# Patient Record
Sex: MALE | Race: BLACK OR AFRICAN AMERICAN | NOT HISPANIC OR LATINO | Employment: UNEMPLOYED | URBAN - METROPOLITAN AREA
[De-identification: names, ages, dates, MRNs, and addresses within clinical notes are randomized per-mention and may not be internally consistent; named-entity substitution may affect disease eponyms.]

---

## 2017-01-24 ENCOUNTER — HOSPITAL ENCOUNTER (EMERGENCY)
Facility: HOSPITAL | Age: 42
Discharge: HOME | End: 2017-01-24
Payer: MEDICAID

## 2017-01-24 VITALS
DIASTOLIC BLOOD PRESSURE: 82 MMHG | SYSTOLIC BLOOD PRESSURE: 108 MMHG | HEART RATE: 82 BPM | HEIGHT: 66 IN | RESPIRATION RATE: 18 BRPM | OXYGEN SATURATION: 99 % | WEIGHT: 155 LBS | BODY MASS INDEX: 24.91 KG/M2 | TEMPERATURE: 98 F

## 2017-01-24 DIAGNOSIS — F19.90 IV DRUG USER: ICD-10-CM

## 2017-01-24 DIAGNOSIS — Z76.89 ENCOUNTER FOR INCISION AND DRAINAGE PROCEDURE: ICD-10-CM

## 2017-01-24 DIAGNOSIS — L02.419 ABSCESS OF ANTECUBITAL FOSSA: Primary | ICD-10-CM

## 2017-01-24 PROCEDURE — 99283 EMERGENCY DEPT VISIT LOW MDM: CPT | Mod: 25

## 2017-01-24 PROCEDURE — 96372 THER/PROPH/DIAG INJ SC/IM: CPT

## 2017-01-24 PROCEDURE — 10060 I&D ABSCESS SIMPLE/SINGLE: CPT

## 2017-01-24 PROCEDURE — 25000003 PHARM REV CODE 250: Performed by: NURSE PRACTITIONER

## 2017-01-24 RX ORDER — LIDOCAINE HYDROCHLORIDE 10 MG/ML
10 INJECTION, SOLUTION EPIDURAL; INFILTRATION; INTRACAUDAL; PERINEURAL
Status: COMPLETED | OUTPATIENT
Start: 2017-01-24 | End: 2017-01-24

## 2017-01-24 RX ORDER — CLINDAMYCIN PHOSPHATE 150 MG/ML
600 INJECTION, SOLUTION INTRAVENOUS
Status: COMPLETED | OUTPATIENT
Start: 2017-01-24 | End: 2017-01-24

## 2017-01-24 RX ORDER — DICLOFENAC SODIUM 50 MG/1
50 TABLET, DELAYED RELEASE ORAL 2 TIMES DAILY
Qty: 20 TABLET | Refills: 0 | Status: SHIPPED | OUTPATIENT
Start: 2017-01-24 | End: 2018-07-22

## 2017-01-24 RX ORDER — IBUPROFEN 800 MG/1
800 TABLET ORAL
Status: COMPLETED | OUTPATIENT
Start: 2017-01-24 | End: 2017-01-24

## 2017-01-24 RX ORDER — CLINDAMYCIN HYDROCHLORIDE 150 MG/1
450 CAPSULE ORAL EVERY 8 HOURS
Qty: 63 CAPSULE | Refills: 0 | Status: SHIPPED | OUTPATIENT
Start: 2017-01-24 | End: 2017-01-31

## 2017-01-24 RX ADMIN — CLINDAMYCIN PHOSPHATE 600 MG: 150 INJECTION, SOLUTION INTRAMUSCULAR; INTRAVENOUS at 07:01

## 2017-01-24 RX ADMIN — LIDOCAINE HYDROCHLORIDE 100 MG: 10 INJECTION, SOLUTION EPIDURAL; INFILTRATION; INTRACAUDAL; PERINEURAL at 07:01

## 2017-01-24 RX ADMIN — IBUPROFEN 800 MG: 800 TABLET ORAL at 07:01

## 2017-01-24 NOTE — ED AVS SNAPSHOT
[facility] -   [address] [facility] [address]               Dominic De Jesus Jr.   2017  6:53 PM   ED    Description:  Male : 1975   Department:  [facility] -            Your Care was Coordinated By:     Provider Role From To    Nate Cintron NP Nurse Practitioner 17 1926 --      Reason for Visit     Abscess           Diagnoses this Visit        Comments    Abscess of antecubital fossa    -  Primary     IV drug user         Encounter for incision and drainage procedure           ED Disposition     None           To Do List           Follow-up Information     Follow up with pcp or ER  In 2 days.    Why:  For wound re-check       These Medications        Disp Refills Start End    clindamycin (CLEOCIN) 150 MG capsule 63 capsule 0 2017    Take 3 capsules (450 mg total) by mouth every 8 (eight) hours. - Oral    diclofenac (VOLTAREN) 50 MG EC tablet 20 tablet 0 2017     Take 1 tablet (50 mg total) by mouth 2 (two) times daily. - Oral      [facility]     [facility] -  Assistance  Registered nurses in the [facility] provide clinical advisement, health education, appointment booking, and other advisory services.  Call for this free service at [phone].             Medications           Message regarding Medications     Verify the changes and/or additions to your medication regime listed below are the same as discussed with your clinician today.  If any of these changes or additions are incorrect, please notify your healthcare provider.        START taking these NEW medications        Refills    clindamycin (CLEOCIN) 150 MG capsule 0    Sig: Take 3 capsules (450 mg total) by mouth every 8 (eight) hours.    Class: Print    Route: Oral    diclofenac (VOLTAREN) 50 MG EC tablet 0    Sig: Take 1 tablet (50 mg total) by mouth 2 (two) times daily.    Class: Print    Route:  "Oral      These medications were administered today        Dose Freq    lidocaine (PF) 10 mg/ml (1%) injection 100 mg 10 mL ED 1 Time    Sig: 10 mLs (100 mg total) by Infiltration route ED 1 Time.    Class: Normal    Route: Infiltration    clindamycin injection 600 mg 600 mg ED 1 Time    Sig: Inject 4 mLs (600 mg total) into the muscle ED 1 Time.    Class: Normal    Route: Intramuscular    ibuprofen tablet 800 mg 800 mg ED 1 Time    Sig: Take 1 tablet (800 mg total) by mouth ED 1 Time.    Class: Normal    Route: Oral      STOP taking these medications     ibuprofen (ADVIL,MOTRIN) 800 MG tablet Take 1 tablet (800 mg total) by mouth every 6 (six) hours as needed for Pain.    meloxicam (MOBIC) 15 MG tablet Take 1 tablet (15 mg total) by mouth once daily.    naproxen sodium (ANAPROX) 550 MG tablet Take 1 tablet (550 mg total) by mouth 2 (two) times daily as needed.           Verify that the below list of medications is an accurate representation of the medications you are currently taking.  If none reported, the list may be blank. If incorrect, please contact your healthcare provider. Carry this list with you in case of emergency.           Current Medications     clindamycin (CLEOCIN) 150 MG capsule Take 3 capsules (450 mg total) by mouth every 8 (eight) hours.    diclofenac (VOLTAREN) 50 MG EC tablet Take 1 tablet (50 mg total) by mouth 2 (two) times daily.           Clinical Reference Information           Your Vitals Were     BP Pulse Temp Resp Height Weight    132/87 (BP Location: Right arm, Patient Position: Sitting) 114 98.3 °F (36.8 °C) (Oral) 20 5' 6" (1.676 m) 70.3 kg (155 lb)    SpO2 BMI             98% 25.02 kg/m2         Allergies as of 1/24/2017     No Known Allergies      Immunizations Administered on Date of Encounter - 1/24/2017     None      ED Micro, Lab, POCT     None      ED Imaging Orders     Start Ordered       Status Ordering Provider    01/24/17 1902 01/24/17 1901  X-Ray Elbow Complete Left  1 " time imaging      Final result         Discharge Instructions         Abscess (Incision & Drainage)  An abscess (sometimes called a boil) occurs when bacteria get trapped under the skin and begin to grow. Pus forms inside the abscess as the body responds to the bacteria. An abscess can occur with an insect bite, ingrown hair, blocked oil gland, pimple, cyst, or puncture wound.  Treatment of your abscess has required an incision to drain the pus. If the abscess pocket was large, a gauze packing may have been inserted. This will need to be removed and possibly replaced on your next visit. Antibiotics are not needed in the treatment of a simple abscess, unless the infection is spreading into the skin around the wound (cellulitis).  Healing of the wound will take about 1 to 2 weeks, depending on the size of the abscess. Healthy tissue will grow from the bottom and sides of the opening until it seals over.  Home care  The following home care guidelines can help your wound heal:  · The wound may drain for the first 2 days. Cover the wound with a clean dry dressing. If the dressing becomes soaked with blood or pus, change it.  · If a gauze packing was placed inside the abscess cavity, you may be told to remove it yourself. You may do this in the shower. Once the packing is removed, you should wash the area in the shower or bath 3 to 4 times a day, until the skin opening has closed. Make sure you wash your hands after changing the packing or cleaning the wound.  · If you were prescribed antibiotics, take them as directed until they are all gone.  · You may use acetaminophen or ibuprofen to control pain, unless another pain medicine was prescribed. If you have liver disease or ever had a stomach ulcer, talk with your doctor before using these medicines.  Follow-up care  Follow up with your doctor as advised by our staff. If a gauze packing was inserted in your wound, it should be removed in 1 to 2 days. Check your wound  every day for the signs of worsening infection listed below.  When to seek medical advice  Call your healthcare provider right away if any of the following occur:  · Increasing redness or swelling  · Red streaks in the skin leading away from the wound  · Increasing local pain or swelling  · Continued pus draining from the wound 2 days after treatment  · Fever of 100.4ºF (38ºC) or higher, or as directed by your healthcare provider  · Boil returns when you are at home  © 1291-4806 [employer]. [address] [place] [address]. All rights reserved. This information is not intended as a substitute for professional medical care. Always follow your healthcare professional's instructions.          Wound Care After Packing Removal or Replacement  Packing is a special type of dressing placed inside a wound to help it heal. Once the packing is removed, you need to care for your wound. Good wound care helps prevent infection. Be sure to keep all follow-up appointments with your healthcare provider. Follow these instructions to take care of the wound once youre at home.  Home care  Your healthcare provider may prescribe medicines for pain. Or he or she may suggest an over-the-counter (OTC) pain reliever, such as ibuprofen or acetaminophen. Talk with your healthcare provider before taking any OTC medicines if you have chronic liver or kidney disease, a stomach ulcer, or gastrointestinal bleeding. In certain cases, you may also need to take antibiotics to help prevent infection. If so, take them exactly as directed for as long as directed. Dont stop taking your antibiotics until they are all gone, even if you feel better.  Here are some general care guidelines for your wound:  · Follow your healthcare providers instructions on how to care for your wound. Always wash your hands with soap and warm water before and after tending to your wound.  · If a bandage was put on, remove and change it once a day or  as directed. If the bandage gets wet or dirty, replace it as soon as possible with a new bandage. Use a clean cloth to gently pat the wound dry.  · If your packing was replaced, a small piece of gauze may hang from the wound. It allows fluid to continue draining from the wound. You may need to use an ointment or cream to keep the packing from sticking to the bandage.  · Don't bathe in a tub or soak your wound until your healthcare provider says its OK. Take showers or sponge baths instead. Avoid swimming.  · Dont scratch, rub, scrub, or pick your wound.  · Check your wound daily for the signs of infection listed below.  If your wound was closed, it was likely with one of four types of closures. These include stitches (sutures), strips of surgical tape, skin glue, and staples. Your healthcare provider will decide on the best closure based on the size and location of your wound. Each type of closure needs specific care.  · Sutures. You may want to clean the wound daily after the first 2 to 3 days. To do this, remove the bandage and gently wash the area with soap and warm water. After cleaning, apply a thin layer of antibiotic ointment if recommended. Then put on a new bandage. Sutures on the outside of the skin usually need to be removed by your healthcare provider.  · Surgical tape. Keep the area dry. If it gets wet, blot it dry with a towel. Surgical tape closures usually fall off within 7 to 10 days. If they have not fallen off after 10 days, you can remove them yourself. To remove the tape, use mineral oil or petroleum jelly on a cotton ball to gently rub the adhesive.  · Skin glue. You may shower or bathe as usual. But do not use soaps, lotions, or ointments on the wound area. Do not scrub the wound. After bathing, pat the wound dry with a soft towel. Do not apply liquids like peroxide, ointments, or creams to the wound while the strips or film is in place. Don't scratch, rub, or pick at the strips or film.  Don't put tape directly over the strips or film. Skin adhesive film will fall off naturally in 5 to 10 days. If it does not peel off in 10 days, gently rub petroleum jelly or an ointment onto the film.  · Staples. Take showers or sponge baths. Don't take tub baths. Don't use lotions on the wound area. The area may be cleaned with soap and water 2 to 3 days after the wound was stapled. Don't scrub the wound. Pat it dry with a clean soft cloth or towel. You can use antibiotic ointment if your healthcare provider tells you to. Staples will need to be removed in 10 to 14 days.  Follow-up care  Follow up with your healthcare provider, or as directed. If your packing was replaced, you may need another visit within 1 to 3 days to remove or replace it. If you have sutures or staples, return for their removal as directed.  When to seek medical advice  Call your health care provider right away if you have signs of infection:  · Fever of 1 degree or more above your normal temperature, or as directed by your healthcare provider  · Increasing pain in the wound or pain that doesnt get better even with pain medicine  · Increasing redness or swelling  · Pus or bad-smelling drainage from the wound  Also call your healthcare provider right away if any of these occur:  · Your wound bleeds more than a small amount or wont stop bleeding.  · The edges of your wound come apart.  · You have numbness or weakness in the wound area that doesnt go away.  © 3600-0581 [employer]. [address] [place] [address], [place] [address]. All rights reserved. This information is not intended as a substitute for professional medical care. Always follow your healthcare professional's instructions.          [facility] Sign-Up     Activating your [facility] account is as easy as 1-2-3!     1) Visit [email or web address], select Sign Up Now, enter this activation code and your date of birth, then select Next.  [email or web address]  Expires: 3/10/2017  8:07 PM       2) Create a username and password to use when you visit [facility] in the future and select a security question in case you lose your password and select Next.    3) Enter your e-mail address and click Sign Up!    Additional Information  If you have questions, please e-mail [email or web address] or call [phone] to talk to our [facility] staff. Remember, [facility] is NOT to be used for urgent needs. For medical emergencies, dial 911.         Smoking Cessation     If you would like to quit smoking:   You may be eligible for free services if you are a [place] resident and started smoking cigarettes before September 1, 1988.  Call the Smoking Cessation Trust (SCT) toll free at (678) 881-9141 or (316) 496-2700.   Call 9-686-QUIT-NOW if you do not meet the above criteria.             [facility] complies with applicable Federal civil rights laws and does not discriminate on the basis of race, color, national origin, age, disability, or sex.        Language Assistance Services     ATTENTION: Language assistance services are available, free of charge. Please call [phone].      ATENCIÓN: Si habla español, tiene a ortiz disposición servicios gratuitos de asistencia lingüística. Llame al [phone].     CHÚ Ý: N?u b?n nói Ti?ng Vi?t, có các d?ch v? h? tr? ngôn ng? mi?n phí dành cho b?n. G?i s? [phone].

## 2017-01-25 NOTE — DISCHARGE INSTRUCTIONS
Abscess (Incision & Drainage)  An abscess (sometimes called a boil) occurs when bacteria get trapped under the skin and begin to grow. Pus forms inside the abscess as the body responds to the bacteria. An abscess can occur with an insect bite, ingrown hair, blocked oil gland, pimple, cyst, or puncture wound.  Treatment of your abscess has required an incision to drain the pus. If the abscess pocket was large, a gauze packing may have been inserted. This will need to be removed and possibly replaced on your next visit. Antibiotics are not needed in the treatment of a simple abscess, unless the infection is spreading into the skin around the wound (cellulitis).  Healing of the wound will take about 1 to 2 weeks, depending on the size of the abscess. Healthy tissue will grow from the bottom and sides of the opening until it seals over.  Home care  The following home care guidelines can help your wound heal:  · The wound may drain for the first 2 days. Cover the wound with a clean dry dressing. If the dressing becomes soaked with blood or pus, change it.  · If a gauze packing was placed inside the abscess cavity, you may be told to remove it yourself. You may do this in the shower. Once the packing is removed, you should wash the area in the shower or bath 3 to 4 times a day, until the skin opening has closed. Make sure you wash your hands after changing the packing or cleaning the wound.  · If you were prescribed antibiotics, take them as directed until they are all gone.  · You may use acetaminophen or ibuprofen to control pain, unless another pain medicine was prescribed. If you have liver disease or ever had a stomach ulcer, talk with your doctor before using these medicines.  Follow-up care  Follow up with your doctor as advised by our staff. If a gauze packing was inserted in your wound, it should be removed in 1 to 2 days. Check your wound every day for the signs of worsening infection listed below.  When to  seek medical advice  Call your healthcare provider right away if any of the following occur:  · Increasing redness or swelling  · Red streaks in the skin leading away from the wound  · Increasing local pain or swelling  · Continued pus draining from the wound 2 days after treatment  · Fever of 100.4ºF (38ºC) or higher, or as directed by your healthcare provider  · Boil returns when you are at home  © 5976-0337 [employer]. [address] [place] [address], [place] [address]. All rights reserved. This information is not intended as a substitute for professional medical care. Always follow your healthcare professional's instructions.          Wound Care After Packing Removal or Replacement  Packing is a special type of dressing placed inside a wound to help it heal. Once the packing is removed, you need to care for your wound. Good wound care helps prevent infection. Be sure to keep all follow-up appointments with your healthcare provider. Follow these instructions to take care of the wound once youre at home.  Home care  Your healthcare provider may prescribe medicines for pain. Or he or she may suggest an over-the-counter (OTC) pain reliever, such as ibuprofen or acetaminophen. Talk with your healthcare provider before taking any OTC medicines if you have chronic liver or kidney disease, a stomach ulcer, or gastrointestinal bleeding. In certain cases, you may also need to take antibiotics to help prevent infection. If so, take them exactly as directed for as long as directed. Dont stop taking your antibiotics until they are all gone, even if you feel better.  Here are some general care guidelines for your wound:  · Follow your healthcare providers instructions on how to care for your wound. Always wash your hands with soap and warm water before and after tending to your wound.  · If a bandage was put on, remove and change it once a day or as directed. If the bandage gets wet or dirty, replace it as soon as  possible with a new bandage. Use a clean cloth to gently pat the wound dry.  · If your packing was replaced, a small piece of gauze may hang from the wound. It allows fluid to continue draining from the wound. You may need to use an ointment or cream to keep the packing from sticking to the bandage.  · Don't bathe in a tub or soak your wound until your healthcare provider says its OK. Take showers or sponge baths instead. Avoid swimming.  · Dont scratch, rub, scrub, or pick your wound.  · Check your wound daily for the signs of infection listed below.  If your wound was closed, it was likely with one of four types of closures. These include stitches (sutures), strips of surgical tape, skin glue, and staples. Your healthcare provider will decide on the best closure based on the size and location of your wound. Each type of closure needs specific care.  · Sutures. You may want to clean the wound daily after the first 2 to 3 days. To do this, remove the bandage and gently wash the area with soap and warm water. After cleaning, apply a thin layer of antibiotic ointment if recommended. Then put on a new bandage. Sutures on the outside of the skin usually need to be removed by your healthcare provider.  · Surgical tape. Keep the area dry. If it gets wet, blot it dry with a towel. Surgical tape closures usually fall off within 7 to 10 days. If they have not fallen off after 10 days, you can remove them yourself. To remove the tape, use mineral oil or petroleum jelly on a cotton ball to gently rub the adhesive.  · Skin glue. You may shower or bathe as usual. But do not use soaps, lotions, or ointments on the wound area. Do not scrub the wound. After bathing, pat the wound dry with a soft towel. Do not apply liquids like peroxide, ointments, or creams to the wound while the strips or film is in place. Don't scratch, rub, or pick at the strips or film. Don't put tape directly over the strips or film. Skin adhesive film will  fall off naturally in 5 to 10 days. If it does not peel off in 10 days, gently rub petroleum jelly or an ointment onto the film.  · Staples. Take showers or sponge baths. Don't take tub baths. Don't use lotions on the wound area. The area may be cleaned with soap and water 2 to 3 days after the wound was stapled. Don't scrub the wound. Pat it dry with a clean soft cloth or towel. You can use antibiotic ointment if your healthcare provider tells you to. Staples will need to be removed in 10 to 14 days.  Follow-up care  Follow up with your healthcare provider, or as directed. If your packing was replaced, you may need another visit within 1 to 3 days to remove or replace it. If you have sutures or staples, return for their removal as directed.  When to seek medical advice  Call your health care provider right away if you have signs of infection:  · Fever of 1 degree or more above your normal temperature, or as directed by your healthcare provider  · Increasing pain in the wound or pain that doesnt get better even with pain medicine  · Increasing redness or swelling  · Pus or bad-smelling drainage from the wound  Also call your healthcare provider right away if any of these occur:  · Your wound bleeds more than a small amount or wont stop bleeding.  · The edges of your wound come apart.  · You have numbness or weakness in the wound area that doesnt go away.  © 2503-1024 [employer]. [address] [place] [address], [place] [address]. All rights reserved. This information is not intended as a substitute for professional medical care. Always follow your healthcare professional's instructions.

## 2017-01-25 NOTE — ED PROVIDER NOTES
SCRIBE #1 NOTE: I, Concepción Chadwick, am scribing for, and in the presence of, Nate Cintron NP. I have scribed the entire note.      History      Chief Complaint   Patient presents with    Abscess     to left ac area. denies drug use       Review of patient's allergies indicates:  No Known Allergies     HPI   HPI    1/24/2017, 6:54 PM   History obtained from the patient      History of Present Illness: Dominic De Jesus Jr. is a 41 y.o. male patient who presents to the Emergency Department for left arm pain in the antecubital area secondary to an abscess. Symptoms are constant and moderate in severity. No mitigating or exacerbating factors reported. No associated sxs reported. Patient denies any extremity numbness/weakness, fever, n/v/d, chills, and all other sxs at this time. No prior Tx reported. No further complaints or concerns at this time.         Arrival mode: Personal vehicle    PCP: Primary Doctor No       Past Medical History:  Past medical history reviewed, not relevant    Past Surgical History:  Past surgical history reviewed, not relevant      Family History:  Family History   Problem Relation Age of Onset    Cancer Neg Hx        Social History:  Social History     Social History Main Topics    Smoking status: Current Every Day Smoker     Packs/day: 0.50     Types: Cigarettes    Smokeless tobacco: Unknown    Alcohol use No    Drug use: IV drug use    Sexual activity: Unknown       ROS   Review of Systems   Constitutional: Negative for chills and fever.   HENT: Negative for sore throat.    Respiratory: Negative for shortness of breath.    Cardiovascular: Negative for chest pain.   Gastrointestinal: Negative for diarrhea, nausea and vomiting.   Genitourinary: Negative for dysuria.   Musculoskeletal: Negative for back pain.        (+) left arm pain to ac area   Skin: Negative for rash.   Neurological: Negative for weakness and numbness.   Hematological: Does not bruise/bleed easily.   All other  systems reviewed and are negative.      Physical Exam    Initial Vitals   BP Pulse Resp Temp SpO2   01/24/17 1818 01/24/17 1818 01/24/17 1818 01/24/17 1818 01/24/17 1818   132/87 114 20 98.3 °F (36.8 °C) 98 %      Physical Exam  Nursing Notes and Vital Signs Reviewed.  Constitutional: Patient is in no acute distress. Awake and alert. Well-developed and well-nourished.  Head: Atraumatic. Normocephalic.  Eyes: PERRL. EOM intact. Conjunctivae are not pale. No scleral icterus.  ENT: Mucous membranes are moist. Oropharynx is clear and symmetric.    Neck: Supple. Full ROM. No lymphadenopathy.  Cardiovascular: Regular rate. Regular rhythm. No murmurs, rubs, or gallops. Distal pulses are 2+ and symmetric.  Pulmonary/Chest: No respiratory distress. Clear to auscultation bilaterally. No wheezing, rales, or rhonchi.  Abdominal: Soft and non-distended.  There is no tenderness.  No rebound, guarding, or rigidity.  Musculoskeletal: Moves all extremities. No obvious deformities. No edema. No calf tenderness.  Skin: Warm and dry. Large, fluctuant, and tender abscess noted to the left antecubital area. FROM in elbow. Redness and swelling doesn't circumvent or cross the joint lines.  Neurological:  Alert, awake, and appropriate.  Normal speech.  No acute focal neurological deficits are appreciated.  Psychiatric: Normal affect. Good eye contact. Appropriate in content.      ED Course    I & D - Incision and Drainage  Date/Time: 1/24/2017 8:06 PM  Performed by: CHRISTIANO STRICKLAND  Authorized by: CHRISTIANO STRICKLAND   Type: abscess  Body area: upper extremity  Location details: left arm    Anesthesia:  Local Anesthetic: lidocaine 1% without epinephrine   Patient sedated: no  Scalpel size: 11  Incision type: single straight  Complexity: simple  Drainage: purulent  Drainage amount: copious  Wound treatment: incision,  wound packed and  drainage  Packing material: 1/2 in gauze  Complications: No  Patient tolerance: Patient tolerated the procedure  "well with no immediate complications        ED Vital Signs:  Vitals:    01/24/17 1818 01/24/17 2000   BP: 132/87 108/82   Pulse: (!) 114 82   Resp: 20 18   Temp: 98.3 °F (36.8 °C)    TempSrc: Oral    SpO2: 98% 99%   Weight: 70.3 kg (155 lb)    Height: 5' 6" (1.676 m)        Imaging Results:  Imaging Results         X-Ray Elbow Complete Left (Final result) Result time:  01/24/17 19:49:23    Final result by LEONID Narvaez Sr., MD (01/24/17 19:49:23)    Impression:         Normal study.      Electronically signed by: LEONID NARVAEZ MD  Date:     01/24/17  Time:    19:49     Narrative:    4 view x-ray of the left elbow    Clinical History:     Other psychoactive substance use, unspecified, uncomplicated    Findings:     There is no fracture. There is no dislocation.                      The Emergency Provider reviewed the vital signs and test results, which are outlined above.    ED Discussion     8:07 PM: Reassessed pt at this time. Pt is awake, alert, and in NAD currently. Pt states his condition has improved at this time. Discussed with pt all pertinent ED information and results. Discussed pt dx and plan of tx. Gave pt all f/u and return to the ED instructions. All questions and concerns were addressed at this time. Pt expresses understanding of information and instructions, and is comfortable with plan to discharge. Pt is stable for discharge.    I discussed wound care precautions with patient and/or family/caretaker; specifically that all wounds have risk of infection despite efforts to cleanse and debride the wound; and there is a risk of an occult foreign body (and thus increased risk of infection) despite a negative examination.  I discussed with patient need to return for any signs of infection, specifically redness, increased pain, fever, drainage of pus, or any concern, immediately.          ED Medication(s):  Medications   lidocaine (PF) 10 mg/ml (1%) injection 100 mg (100 mg Infiltration Given 1/24/17 1915) "   clindamycin injection 600 mg (600 mg Intramuscular Given 1/24/17 1915)   ibuprofen tablet 800 mg (800 mg Oral Given 1/24/17 1915)       Discharge Medication List as of 1/24/2017  8:07 PM      START taking these medications    Details   clindamycin (CLEOCIN) 150 MG capsule Take 3 capsules (450 mg total) by mouth every 8 (eight) hours., Starting 1/24/2017, Until Tue 1/31/17, Print      diclofenac (VOLTAREN) 50 MG EC tablet Take 1 tablet (50 mg total) by mouth 2 (two) times daily., Starting 1/24/2017, Until Discontinued, Print             Follow-up Information     Follow up with pcp or ER  In 2 days.    Why:  For wound re-check            Medical Decision Making    Medical Decision Making:   Clinical Tests:   Radiological Study: Ordered and Reviewed     Additional MDM:   Smoking Cessation: The patient was counseled on tobacco related  health complications.        Scribe Attestation:   Scribe #1: I performed the above scribed service and the documentation accurately describes the services I performed. I attest to the accuracy of the note.    Attending:   Physician Attestation Statement for Scribe #1: I, Nate Cintron NP, personally performed the services described in this documentation, as scribed by Concepción Chadwick, in my presence, and it is both accurate and complete.         Attending Attestation:     Physician Attestation Statement for NP/PA:   I discussed this assessment and plan of this patient with the NP/PA, but I did not personally examine the patient. The face to face encounter was performed by the NP/PA.              Clinical Impression       ICD-10-CM ICD-9-CM   1. Abscess of antecubital fossa L02.419 682.3   2. IV drug user F19.90 305.90   3. Encounter for incision and drainage procedure Z01.89 V72.85       Disposition:   Disposition: Discharged  Condition: Stable         Nate Cintron NP  01/25/17 0213       Kaylah Hernández MD  01/25/17 [id number]

## 2018-07-19 ENCOUNTER — HOSPITAL ENCOUNTER (INPATIENT)
Facility: HOSPITAL | Age: 43
LOS: 1 days | Discharge: LEFT BEFORE DISCHARGE | DRG: 513 | End: 2018-07-20
Attending: ORTHOPAEDIC SURGERY | Admitting: ORTHOPAEDIC SURGERY
Payer: MEDICAID

## 2018-07-19 ENCOUNTER — ANESTHESIA EVENT (OUTPATIENT)
Dept: SURGERY | Facility: HOSPITAL | Age: 43
DRG: 513 | End: 2018-07-19
Payer: MEDICAID

## 2018-07-19 ENCOUNTER — ANESTHESIA (OUTPATIENT)
Dept: SURGERY | Facility: HOSPITAL | Age: 43
DRG: 513 | End: 2018-07-19
Payer: MEDICAID

## 2018-07-19 VITALS
DIASTOLIC BLOOD PRESSURE: 75 MMHG | HEIGHT: 66 IN | OXYGEN SATURATION: 97 % | HEART RATE: 105 BPM | SYSTOLIC BLOOD PRESSURE: 126 MMHG | TEMPERATURE: 98 F | WEIGHT: 151.25 LBS | RESPIRATION RATE: 24 BRPM | BODY MASS INDEX: 24.31 KG/M2

## 2018-07-19 DIAGNOSIS — M25.539 WRIST PAIN: ICD-10-CM

## 2018-07-19 DIAGNOSIS — M25.431 PAIN AND SWELLING OF RIGHT WRIST: Primary | ICD-10-CM

## 2018-07-19 DIAGNOSIS — M25.531 PAIN AND SWELLING OF RIGHT WRIST: Primary | ICD-10-CM

## 2018-07-19 DIAGNOSIS — M79.5 FOREIGN BODY (FB) IN SOFT TISSUE: ICD-10-CM

## 2018-07-19 DIAGNOSIS — L02.91 ABSCESS: ICD-10-CM

## 2018-07-19 LAB
ABO + RH BLD: NORMAL
ALBUMIN SERPL BCP-MCNC: 3.9 G/DL
ALP SERPL-CCNC: 84 U/L
ALT SERPL W/O P-5'-P-CCNC: 36 U/L
ANION GAP SERPL CALC-SCNC: 12 MMOL/L
APTT BLDCRRT: 29.6 SEC
AST SERPL-CCNC: 45 U/L
BASOPHILS # BLD AUTO: 0.03 K/UL
BASOPHILS NFR BLD: 0.3 %
BILIRUB SERPL-MCNC: 0.5 MG/DL
BLD GP AB SCN CELLS X3 SERPL QL: NORMAL
BUN SERPL-MCNC: 9 MG/DL
CALCIUM SERPL-MCNC: 9.7 MG/DL
CHLORIDE SERPL-SCNC: 101 MMOL/L
CO2 SERPL-SCNC: 23 MMOL/L
CREAT SERPL-MCNC: 0.9 MG/DL
CRP SERPL-MCNC: 7 MG/L
DIFFERENTIAL METHOD: NORMAL
EOSINOPHIL # BLD AUTO: 0 K/UL
EOSINOPHIL NFR BLD: 0.3 %
ERYTHROCYTE [DISTWIDTH] IN BLOOD BY AUTOMATED COUNT: 13.4 %
ERYTHROCYTE [SEDIMENTATION RATE] IN BLOOD BY WESTERGREN METHOD: 28 MM/HR
EST. GFR  (AFRICAN AMERICAN): >60 ML/MIN/1.73 M^2
EST. GFR  (NON AFRICAN AMERICAN): >60 ML/MIN/1.73 M^2
GLUCOSE SERPL-MCNC: 100 MG/DL
HCT VFR BLD AUTO: 41.3 %
HGB BLD-MCNC: 14.3 G/DL
INR PPP: 1
LYMPHOCYTES # BLD AUTO: 2.8 K/UL
LYMPHOCYTES NFR BLD: 30.7 %
MCH RBC QN AUTO: 29.2 PG
MCHC RBC AUTO-ENTMCNC: 34.6 G/DL
MCV RBC AUTO: 84 FL
MONOCYTES # BLD AUTO: 0.6 K/UL
MONOCYTES NFR BLD: 6.7 %
NEUTROPHILS # BLD AUTO: 5.7 K/UL
NEUTROPHILS NFR BLD: 62.3 %
PLATELET # BLD AUTO: 267 K/UL
PMV BLD AUTO: 9.2 FL
POTASSIUM SERPL-SCNC: 3.3 MMOL/L
PROT SERPL-MCNC: 8.4 G/DL
PROTHROMBIN TIME: 10.7 SEC
RBC # BLD AUTO: 4.9 M/UL
SODIUM SERPL-SCNC: 136 MMOL/L
WBC # BLD AUTO: 9.13 K/UL

## 2018-07-19 PROCEDURE — 36415 COLL VENOUS BLD VENIPUNCTURE: CPT

## 2018-07-19 PROCEDURE — 71000033 HC RECOVERY, INTIAL HOUR: Performed by: ORTHOPAEDIC SURGERY

## 2018-07-19 PROCEDURE — 63600175 PHARM REV CODE 636 W HCPCS: Performed by: NURSE ANESTHETIST, CERTIFIED REGISTERED

## 2018-07-19 PROCEDURE — 63600175 PHARM REV CODE 636 W HCPCS: Performed by: ORTHOPAEDIC SURGERY

## 2018-07-19 PROCEDURE — 85610 PROTHROMBIN TIME: CPT

## 2018-07-19 PROCEDURE — 25000003 PHARM REV CODE 250: Performed by: ORTHOPAEDIC SURGERY

## 2018-07-19 PROCEDURE — 25000003 PHARM REV CODE 250: Performed by: REGISTERED NURSE

## 2018-07-19 PROCEDURE — 88311 DECALCIFY TISSUE: CPT | Performed by: PATHOLOGY

## 2018-07-19 PROCEDURE — 86140 C-REACTIVE PROTEIN: CPT

## 2018-07-19 PROCEDURE — 87070 CULTURE OTHR SPECIMN AEROBIC: CPT | Mod: 59

## 2018-07-19 PROCEDURE — 25000003 PHARM REV CODE 250: Performed by: NURSE ANESTHETIST, CERTIFIED REGISTERED

## 2018-07-19 PROCEDURE — 63600175 PHARM REV CODE 636 W HCPCS: Performed by: REGISTERED NURSE

## 2018-07-19 PROCEDURE — 11000001 HC ACUTE MED/SURG PRIVATE ROOM

## 2018-07-19 PROCEDURE — 87205 SMEAR GRAM STAIN: CPT | Mod: 59

## 2018-07-19 PROCEDURE — 80053 COMPREHEN METABOLIC PANEL: CPT

## 2018-07-19 PROCEDURE — 88307 TISSUE EXAM BY PATHOLOGIST: CPT | Mod: 26,,, | Performed by: PATHOLOGY

## 2018-07-19 PROCEDURE — 63600175 PHARM REV CODE 636 W HCPCS: Performed by: ANESTHESIOLOGY

## 2018-07-19 PROCEDURE — 87075 CULTR BACTERIA EXCEPT BLOOD: CPT

## 2018-07-19 PROCEDURE — 36000705 HC OR TIME LEV I EA ADD 15 MIN: Performed by: ORTHOPAEDIC SURGERY

## 2018-07-19 PROCEDURE — 88311 DECALCIFY TISSUE: CPT | Mod: 26,,, | Performed by: PATHOLOGY

## 2018-07-19 PROCEDURE — 80074 ACUTE HEPATITIS PANEL: CPT

## 2018-07-19 PROCEDURE — 96375 TX/PRO/DX INJ NEW DRUG ADDON: CPT | Mod: 59

## 2018-07-19 PROCEDURE — 87147 CULTURE TYPE IMMUNOLOGIC: CPT

## 2018-07-19 PROCEDURE — 96374 THER/PROPH/DIAG INJ IV PUSH: CPT

## 2018-07-19 PROCEDURE — 85025 COMPLETE CBC W/AUTO DIFF WBC: CPT

## 2018-07-19 PROCEDURE — 37000009 HC ANESTHESIA EA ADD 15 MINS: Performed by: ORTHOPAEDIC SURGERY

## 2018-07-19 PROCEDURE — 86703 HIV-1/HIV-2 1 RESULT ANTBDY: CPT

## 2018-07-19 PROCEDURE — 99285 EMERGENCY DEPT VISIT HI MDM: CPT | Mod: 25

## 2018-07-19 PROCEDURE — A9585 GADOBUTROL INJECTION: HCPCS | Performed by: REGISTERED NURSE

## 2018-07-19 PROCEDURE — 0PBM0ZZ EXCISION OF RIGHT CARPAL, OPEN APPROACH: ICD-10-PCS | Performed by: ORTHOPAEDIC SURGERY

## 2018-07-19 PROCEDURE — 25500020 PHARM REV CODE 255: Performed by: REGISTERED NURSE

## 2018-07-19 PROCEDURE — 88307 TISSUE EXAM BY PATHOLOGIST: CPT | Performed by: PATHOLOGY

## 2018-07-19 PROCEDURE — 85730 THROMBOPLASTIN TIME PARTIAL: CPT

## 2018-07-19 PROCEDURE — 86850 RBC ANTIBODY SCREEN: CPT

## 2018-07-19 PROCEDURE — 37000008 HC ANESTHESIA 1ST 15 MINUTES: Performed by: ORTHOPAEDIC SURGERY

## 2018-07-19 PROCEDURE — 25000003 PHARM REV CODE 250: Performed by: ANESTHESIOLOGY

## 2018-07-19 PROCEDURE — 96376 TX/PRO/DX INJ SAME DRUG ADON: CPT

## 2018-07-19 PROCEDURE — 85651 RBC SED RATE NONAUTOMATED: CPT

## 2018-07-19 PROCEDURE — 36000704 HC OR TIME LEV I 1ST 15 MIN: Performed by: ORTHOPAEDIC SURGERY

## 2018-07-19 RX ORDER — ONDANSETRON 8 MG/1
8 TABLET, ORALLY DISINTEGRATING ORAL EVERY 8 HOURS PRN
Status: DISCONTINUED | OUTPATIENT
Start: 2018-07-19 | End: 2018-07-20 | Stop reason: HOSPADM

## 2018-07-19 RX ORDER — MORPHINE SULFATE 4 MG/ML
6 INJECTION, SOLUTION INTRAMUSCULAR; INTRAVENOUS
Status: COMPLETED | OUTPATIENT
Start: 2018-07-19 | End: 2018-07-19

## 2018-07-19 RX ORDER — CEFAZOLIN SODIUM 1 G/3ML
INJECTION, POWDER, FOR SOLUTION INTRAMUSCULAR; INTRAVENOUS
Status: DISCONTINUED | OUTPATIENT
Start: 2018-07-19 | End: 2018-07-19

## 2018-07-19 RX ORDER — SODIUM CHLORIDE, SODIUM LACTATE, POTASSIUM CHLORIDE, CALCIUM CHLORIDE 600; 310; 30; 20 MG/100ML; MG/100ML; MG/100ML; MG/100ML
INJECTION, SOLUTION INTRAVENOUS CONTINUOUS PRN
Status: DISCONTINUED | OUTPATIENT
Start: 2018-07-19 | End: 2018-07-19

## 2018-07-19 RX ORDER — CEFAZOLIN SODIUM 1 G/3ML
2 INJECTION, POWDER, FOR SOLUTION INTRAMUSCULAR; INTRAVENOUS ONCE
Status: DISCONTINUED | OUTPATIENT
Start: 2018-07-19 | End: 2018-07-19 | Stop reason: HOSPADM

## 2018-07-19 RX ORDER — AMPICILLIN AND SULBACTAM 2; 1 G/1; G/1
3 INJECTION, POWDER, FOR SOLUTION INTRAMUSCULAR; INTRAVENOUS
Status: DISCONTINUED | OUTPATIENT
Start: 2018-07-19 | End: 2018-07-19

## 2018-07-19 RX ORDER — ONDANSETRON 2 MG/ML
4 INJECTION INTRAMUSCULAR; INTRAVENOUS DAILY PRN
Status: DISCONTINUED | OUTPATIENT
Start: 2018-07-19 | End: 2018-07-19

## 2018-07-19 RX ORDER — CHLORHEXIDINE GLUCONATE ORAL RINSE 1.2 MG/ML
10 SOLUTION DENTAL 2 TIMES DAILY
Status: DISCONTINUED | OUTPATIENT
Start: 2018-07-19 | End: 2018-07-20 | Stop reason: HOSPADM

## 2018-07-19 RX ORDER — GADOBUTROL 604.72 MG/ML
7 INJECTION INTRAVENOUS
Status: COMPLETED | OUTPATIENT
Start: 2018-07-19 | End: 2018-07-19

## 2018-07-19 RX ORDER — LIDOCAINE HYDROCHLORIDE 10 MG/ML
10 INJECTION, SOLUTION EPIDURAL; INFILTRATION; INTRACAUDAL; PERINEURAL
Status: COMPLETED | OUTPATIENT
Start: 2018-07-19 | End: 2018-07-19

## 2018-07-19 RX ORDER — ONDANSETRON 2 MG/ML
4 INJECTION INTRAMUSCULAR; INTRAVENOUS
Status: COMPLETED | OUTPATIENT
Start: 2018-07-19 | End: 2018-07-19

## 2018-07-19 RX ORDER — ONDANSETRON 2 MG/ML
INJECTION INTRAMUSCULAR; INTRAVENOUS
Status: DISCONTINUED | OUTPATIENT
Start: 2018-07-19 | End: 2018-07-19

## 2018-07-19 RX ORDER — AMOXICILLIN 250 MG
1 CAPSULE ORAL 2 TIMES DAILY
Status: DISCONTINUED | OUTPATIENT
Start: 2018-07-19 | End: 2018-07-20 | Stop reason: HOSPADM

## 2018-07-19 RX ORDER — MEPERIDINE HYDROCHLORIDE 50 MG/ML
12.5 INJECTION INTRAMUSCULAR; INTRAVENOUS; SUBCUTANEOUS ONCE AS NEEDED
Status: COMPLETED | OUTPATIENT
Start: 2018-07-19 | End: 2018-07-19

## 2018-07-19 RX ORDER — MORPHINE SULFATE 4 MG/ML
2 INJECTION, SOLUTION INTRAMUSCULAR; INTRAVENOUS
Status: DISCONTINUED | OUTPATIENT
Start: 2018-07-19 | End: 2018-07-20 | Stop reason: HOSPADM

## 2018-07-19 RX ORDER — OXYCODONE AND ACETAMINOPHEN 5; 325 MG/1; MG/1
1 TABLET ORAL EVERY 4 HOURS PRN
Status: DISCONTINUED | OUTPATIENT
Start: 2018-07-19 | End: 2018-07-20 | Stop reason: HOSPADM

## 2018-07-19 RX ORDER — FENTANYL CITRATE 50 UG/ML
INJECTION, SOLUTION INTRAMUSCULAR; INTRAVENOUS
Status: DISCONTINUED | OUTPATIENT
Start: 2018-07-19 | End: 2018-07-19

## 2018-07-19 RX ORDER — NEOMYCIN AND POLYMYXIN B SULFATES 40; 200000 MG/ML; [USP'U]/ML
SOLUTION IRRIGATION
Status: DISCONTINUED | OUTPATIENT
Start: 2018-07-19 | End: 2018-07-19 | Stop reason: HOSPADM

## 2018-07-19 RX ORDER — POLYETHYLENE GLYCOL 3350 17 G/17G
17 POWDER, FOR SOLUTION ORAL DAILY
Status: DISCONTINUED | OUTPATIENT
Start: 2018-07-20 | End: 2018-07-20 | Stop reason: HOSPADM

## 2018-07-19 RX ORDER — SODIUM CHLORIDE 0.9 % (FLUSH) 0.9 %
3 SYRINGE (ML) INJECTION
Status: DISCONTINUED | OUTPATIENT
Start: 2018-07-19 | End: 2018-07-19 | Stop reason: HOSPADM

## 2018-07-19 RX ORDER — OXYCODONE AND ACETAMINOPHEN 5; 325 MG/1; MG/1
1 TABLET ORAL
Status: DISCONTINUED | OUTPATIENT
Start: 2018-07-19 | End: 2018-07-19

## 2018-07-19 RX ORDER — CEFAZOLIN SODIUM 1 G/3ML
INJECTION, POWDER, FOR SOLUTION INTRAMUSCULAR; INTRAVENOUS
Status: DISCONTINUED | OUTPATIENT
Start: 2018-07-19 | End: 2018-07-19 | Stop reason: HOSPADM

## 2018-07-19 RX ORDER — LIDOCAINE HYDROCHLORIDE 10 MG/ML
INJECTION INFILTRATION; PERINEURAL
Status: DISCONTINUED | OUTPATIENT
Start: 2018-07-19 | End: 2018-07-19

## 2018-07-19 RX ORDER — PROPOFOL 10 MG/ML
VIAL (ML) INTRAVENOUS
Status: DISCONTINUED | OUTPATIENT
Start: 2018-07-19 | End: 2018-07-19

## 2018-07-19 RX ORDER — MORPHINE SULFATE 4 MG/ML
2 INJECTION, SOLUTION INTRAMUSCULAR; INTRAVENOUS EVERY 5 MIN PRN
Status: DISCONTINUED | OUTPATIENT
Start: 2018-07-19 | End: 2018-07-19 | Stop reason: HOSPADM

## 2018-07-19 RX ORDER — MORPHINE SULFATE 4 MG/ML
4 INJECTION, SOLUTION INTRAMUSCULAR; INTRAVENOUS
Status: COMPLETED | OUTPATIENT
Start: 2018-07-19 | End: 2018-07-19

## 2018-07-19 RX ORDER — FAMOTIDINE 20 MG/1
20 TABLET, FILM COATED ORAL 2 TIMES DAILY
Status: DISCONTINUED | OUTPATIENT
Start: 2018-07-19 | End: 2018-07-20 | Stop reason: HOSPADM

## 2018-07-19 RX ORDER — HYDROMORPHONE HYDROCHLORIDE 2 MG/ML
1 INJECTION, SOLUTION INTRAMUSCULAR; INTRAVENOUS; SUBCUTANEOUS
Status: COMPLETED | OUTPATIENT
Start: 2018-07-19 | End: 2018-07-19

## 2018-07-19 RX ORDER — BACITRACIN 50000 [IU]/1
INJECTION, POWDER, FOR SOLUTION INTRAMUSCULAR
Status: DISCONTINUED | OUTPATIENT
Start: 2018-07-19 | End: 2018-07-19 | Stop reason: HOSPADM

## 2018-07-19 RX ADMIN — GADOBUTROL 7 ML: 604.72 INJECTION INTRAVENOUS at 06:07

## 2018-07-19 RX ADMIN — PROPOFOL 150 MG: 10 INJECTION, EMULSION INTRAVENOUS at 08:07

## 2018-07-19 RX ADMIN — LIDOCAINE HYDROCHLORIDE 40 MG: 10 INJECTION, SOLUTION INFILTRATION; PERINEURAL at 08:07

## 2018-07-19 RX ADMIN — MEPERIDINE HYDROCHLORIDE 12.5 MG: 50 INJECTION INTRAMUSCULAR; INTRAVENOUS; SUBCUTANEOUS at 10:07

## 2018-07-19 RX ADMIN — FAMOTIDINE 20 MG: 20 TABLET ORAL at 11:07

## 2018-07-19 RX ADMIN — LORAZEPAM 1 MG: 2 INJECTION INTRAMUSCULAR; INTRAVENOUS at 06:07

## 2018-07-19 RX ADMIN — MORPHINE SULFATE 2 MG: 4 INJECTION INTRAVENOUS at 10:07

## 2018-07-19 RX ADMIN — SODIUM CHLORIDE 3 G: 9 INJECTION, SOLUTION INTRAVENOUS at 11:07

## 2018-07-19 RX ADMIN — ONDANSETRON 4 MG: 2 INJECTION, SOLUTION INTRAMUSCULAR; INTRAVENOUS at 04:07

## 2018-07-19 RX ADMIN — MORPHINE SULFATE 6 MG: 4 INJECTION INTRAVENOUS at 05:07

## 2018-07-19 RX ADMIN — PROMETHAZINE HYDROCHLORIDE 6.25 MG: 25 INJECTION INTRAMUSCULAR; INTRAVENOUS at 10:07

## 2018-07-19 RX ADMIN — FENTANYL CITRATE 100 MCG: 50 INJECTION, SOLUTION INTRAMUSCULAR; INTRAVENOUS at 08:07

## 2018-07-19 RX ADMIN — LIDOCAINE HYDROCHLORIDE 100 MG: 10 INJECTION, SOLUTION EPIDURAL; INFILTRATION; INTRACAUDAL; PERINEURAL at 04:07

## 2018-07-19 RX ADMIN — AMPICILLIN SODIUM AND SULBACTAM SODIUM 3 G: 2; 1 INJECTION, POWDER, FOR SOLUTION INTRAMUSCULAR; INTRAVENOUS at 09:07

## 2018-07-19 RX ADMIN — FENTANYL CITRATE 50 MCG: 50 INJECTION, SOLUTION INTRAMUSCULAR; INTRAVENOUS at 09:07

## 2018-07-19 RX ADMIN — CEFAZOLIN 2 G: 1 INJECTION, POWDER, FOR SOLUTION INTRAMUSCULAR; INTRAVENOUS at 09:07

## 2018-07-19 RX ADMIN — MORPHINE SULFATE 4 MG: 4 INJECTION INTRAVENOUS at 04:07

## 2018-07-19 RX ADMIN — CHLORHEXIDINE GLUCONATE 10 ML: 1.2 RINSE ORAL at 11:07

## 2018-07-19 RX ADMIN — ONDANSETRON 4 MG: 2 INJECTION, SOLUTION INTRAMUSCULAR; INTRAVENOUS at 08:07

## 2018-07-19 RX ADMIN — STANDARDIZED SENNA CONCENTRATE AND DOCUSATE SODIUM 1 TABLET: 8.6; 5 TABLET, FILM COATED ORAL at 11:07

## 2018-07-19 RX ADMIN — SODIUM CHLORIDE, SODIUM LACTATE, POTASSIUM CHLORIDE, AND CALCIUM CHLORIDE: 600; 310; 30; 20 INJECTION, SOLUTION INTRAVENOUS at 08:07

## 2018-07-19 RX ADMIN — MORPHINE SULFATE 2 MG: 4 INJECTION INTRAVENOUS at 11:07

## 2018-07-19 RX ADMIN — HYDROMORPHONE HYDROCHLORIDE 1 MG: 2 INJECTION, SOLUTION INTRAMUSCULAR; INTRAVENOUS; SUBCUTANEOUS at 06:07

## 2018-07-19 NOTE — CONSULTS
[facility]  Orthopedics  Consult Note    Patient Name: Dominic De Jesus Jr.  MRN: [id number]  Admission Date: 7/19/2018  Hospital Length of Stay: 0 days  Attending Provider: No att. providers found  Primary Care Provider: Primary Doctor No    Patient information was obtained from patient and ER records.     Consults  Subjective:     Principal Problem:<principal problem not specified>    Chief Complaint:   Chief Complaint   Patient presents with    Abscess     c/o abscess to right AC, patient states pain and swelling following IV drug use        HPI:  42-year-old male who will consult and also a chief complaint of right wrist pain, states the pain began approximately this morning sudden onset worsening since also reports history of needle being broken off in the right antecubital fossa the same side of the patient's wrist complaints.  Still unsure of when that happened maybe a day.  The patient reports intense wrist pain and inability to move his wrist lot of pain we were consulted by the emergency room for evaluation. Patient denies any other pain dizziness nausea vomiting.  Positive for joint pain loss of range of motion.    History reviewed. No pertinent past medical history.    History reviewed. No pertinent surgical history.    Review of patient's allergies indicates:  No Known Allergies    No current facility-administered medications for this encounter.      Current Outpatient Prescriptions   Medication Sig    diclofenac (VOLTAREN) 50 MG EC tablet Take 1 tablet (50 mg total) by mouth 2 (two) times daily.     Family History     None        Social History Main Topics    Smoking status: Current Every Day Smoker     Packs/day: 0.50     Types: Cigarettes    Smokeless tobacco: Not on file    Alcohol use No    Drug use: Yes     Types: IV      Comment: heroin    Sexual activity: Not on file     Review of Systems   Constitution: Negative for chills, decreased appetite, diaphoresis and  "weakness.   HENT: Negative for ear discharge and ear pain.    Eyes: Negative for blurred vision, double vision and pain.   Cardiovascular: Negative for chest pain, claudication and dyspnea on exertion.   Respiratory: Negative for cough, hemoptysis and shortness of breath.    Skin: Negative for color change and nail changes.   Musculoskeletal: Positive for joint pain and joint swelling.   Gastrointestinal: Negative for bloating, abdominal pain and anorexia.   Genitourinary: Negative for bladder incontinence and flank pain.   Neurological: Negative for dizziness, headaches, numbness and paresthesias.   Psychiatric/Behavioral: Negative for altered mental status and memory loss.     Objective:     Vital Signs (Most Recent):  Temp: 97.7 °F (36.5 °C) (07/19/18 1541)  Pulse: 86 (07/19/18 1735)  Resp: (!) 24 (07/19/18 1735)  BP: (!) 158/96 (07/19/18 1735)  SpO2: 100 % (07/19/18 1735) Vital Signs (24h Range):  Temp:  [97.7 °F (36.5 °C)] 97.7 °F (36.5 °C)  Pulse:  [84-86] 86  Resp:  [18-24] 24  SpO2:  [100 %] 100 %  BP: (148-158)/(86-96) 158/96     Weight: 70.3 kg (155 lb)  Height: 5' 6" (167.6 cm)  Body mass index is 25.02 kg/m².    No intake or output data in the 24 hours ending 07/19/18 1804    General    Constitutional: He is oriented to person, place, and time. He appears well-developed and well-nourished.   HENT:   Head: Normocephalic and atraumatic.   Nose: Nose normal.   Eyes: EOM are normal. Pupils are equal, round, and reactive to light.   Cardiovascular: Normal rate and regular rhythm.    Pulmonary/Chest: Effort normal and breath sounds normal.   Abdominal: Soft. Bowel sounds are normal. He exhibits no distension. There is no tenderness.   Neurological: He is alert and oriented to person, place, and time. He has normal reflexes. A cranial nerve deficit is present.   Psychiatric: He has a normal mood and affect.             Right Hand/Wrist Exam     Inspection   Effusion: Wrist - present   Deformity: Wrist - " deformity       Left Hand/Wrist Exam   Left hand exam is normal.        Right upper extremity exam  The patient with intact EPL FPL/FDP-I, pain and limited range of motion with wrist flexion and wrist extension  Tender palpation at dorsal lateral wrist near the radial carpal junction, edema noted, slight warmth to touch  2+ RP    Imaging Results          X-Ray Chest 1 View (Final result)  Result time 07/19/18 17:31:19    Final result by Teofilo Jones MD (07/19/18 17:31:19)                 Impression:      1.  Negative for acute process involving the chest.    2.  Incidental findings as noted above.      Electronically signed by: Teofilo Jones MD  Date:    07/19/2018  Time:    17:31             Narrative:    EXAMINATION:  XR CHEST 1 VIEW    CLINICAL HISTORY:  Pain in unspecified wrist    COMPARISON:  No comparison studies are available.    FINDINGS:  The lungs are clear. The cardiac silhouette size is normal. The trachea is midline and the mediastinal width is normal. Negative for focal infiltrate, effusion or pneumothorax. Pulmonary vasculature is normal. Negative for osseous abnormalities. Convex-left curvature of the upper thoracic spine.  Tortuous aorta.                               X-Ray Wrist Complete Right (Final result)  Result time 07/19/18 16:21:21    Final result by LEONID Narvaez Sr., MD (07/19/18 16:21:21)                 Impression:      1. There are mild osteoarthritic changes in the radiocarpal joint.  2. There is mild prominence of the soft tissue thickness dorsal to the right wrist.  3. If additional imaging evaluation is clinically indicated, I recommend consideration of an MRI examination of the right wrist without IV contrast.      Electronically signed by: Nabil Narvaez MD  Date:    07/19/2018  Time:    16:21             Narrative:    EXAMINATION:  XR WRIST COMPLETE 3 VIEWS RIGHT    CLINICAL HISTORY:  Pain in unspecified wrist    COMPARISON:  None    FINDINGS:  There is no acute fracture  visualized.  There are mild osteoarthritic changes in the radiocarpal joint.  There is no dislocation.  There is mild prominence of the soft tissue thickness dorsal to the right wrist.                               X-Ray Elbow Complete Right (Final result)  Result time 07/19/18 16:18:51    Final result by LEONID Narvaez Sr., MD (07/19/18 16:18:51)                 Impression:      1. There is a 7 mm curvilinear radiopaque object in the soft tissue of the cubital fossa.   This is characteristic of a foreign body.  2. There is no radiographic evidence of osteomyelitis.      Electronically signed by: Nabil Narvaez MD  Date:    07/19/2018  Time:    16:18             Narrative:    EXAMINATION:  XR ELBOW COMPLETE 3 VIEW RIGHT    CLINICAL HISTORY:  Cutaneous abscess, unspecified    COMPARISON:  None    FINDINGS:  There is no fracture. There is no dislocation.  There is a 7 mm curvilinear radiopaque object in the soft tissue of the cubital fossa.  There is no radiographic evidence of osteomyelitis.                             MRI in progress    WBC 9.13, hemoglobin 14.3, hematocrit 41.3, platelets 273    Sodium 3.6, potassium 3.3 low, chloride 101, CO2 23, BUN 9, creatinine 0.9, glucose 100    CRP 7.8 now    Sed rate pending      Significant Labs: All pertinent labs within the past 24 hours have been reviewed.    Significant Imaging: X-Ray: I have reviewed all pertinent results/findings and my personal findings are:  [phone]    Assessment/Plan:     42-year-old male with right swollen wrist pain reduced range of motion and a foreign body in right antecubital fossa  1.  Patient undergo MRI without contrast of the right wrist  2.  Patient last ate at noon plan for OR with probable irrigation debridement washout of right wrist along with exploration removal foreign body right antecubital fossa  3.  Remain in npo  4.  Follow-up MRIs also discussed with Dr. Wnin for further recommendations  There are no [facility]  problems to display for this patient.      Thank you for your consult. I will follow-up with patient. Please contact us if you have any additional questions.    Kuldeep Juan PA-C  Orthopedics  [facility]

## 2018-07-19 NOTE — ED PROVIDER NOTES
SCRIBE #1 NOTE: I, Annie Torres, am scribing for, and in the presence of, Jonathan Woods NP. I have scribed the entire note.      History      Chief Complaint   Patient presents with    Abscess     c/o abscess to right AC, patient states pain and swelling following IV drug use       Review of patient's allergies indicates:  No Known Allergies     HPI   HPI    7/19/2018, 3:47 PM   History obtained from the patient      History of Present Illness: Dominic De Jesus Jr. is a 42 y.o. male patient who presents to the Emergency Department for abscess to right antecubital area which onset gradually today. Symptoms are constant and moderate in severity. Pt reports injecting heroin into R antecubital yesterday. No mitigating or exacerbating factors reported. Pt also c/o R wrist pain. Pt denies any injury to R wrist. Patient denies any CP, SOB, fever, chills, n/v/d, and all other sxs at this time. No further complaints or concerns at this time.         Arrival mode: Personal vehicle     PCP: Primary Doctor No       Past Medical History:  History reviewed. No pertinent past medical history.    Past Surgical History:  History reviewed. No pertinent surgical history.      Family History:  Family History   Problem Relation Age of Onset    Cancer Neg Hx        Social History:  Social History     Social History Main Topics    Smoking status: Current Every Day Smoker     Packs/day: 0.50     Types: Cigarettes    Smokeless tobacco: Not on file    Alcohol use No    Drug use: Yes     Types: IV      Comment: heroin    Sexual activity: Not on file       ROS   Review of Systems   Constitutional: Negative for chills and fever.   HENT: Negative for sore throat.    Respiratory: Negative for shortness of breath.    Cardiovascular: Negative for chest pain.   Gastrointestinal: Negative for abdominal pain, diarrhea, nausea and vomiting.   Genitourinary: Negative for dysuria.   Musculoskeletal: Negative for back pain and neck pain.         (+) R wrist pain   Skin: Negative for rash.        (+) Abscess to R antecubital area   Neurological: Negative for dizziness, syncope, weakness, numbness and headaches.   Hematological: Does not bruise/bleed easily.   All other systems reviewed and are negative.      Physical Exam      Initial Vitals [07/19/18 1541]   BP Pulse Resp Temp SpO2   (!) 148/86 84 18 97.7 °F (36.5 °C) 100 %      MAP       --          Physical Exam  Nursing Notes and Vital Signs Reviewed.  Constitutional: Patient is in no acute distress. Well-developed and well-nourished.  Head: Atraumatic. Normocephalic.  Eyes: PERRL. EOM intact. Conjunctivae are not pale. No scleral icterus.  ENT: Mucous membranes are moist. Oropharynx is clear and symmetric.    Neck: Supple. Full ROM. No lymphadenopathy.  Cardiovascular: Regular rate. Regular rhythm. No murmurs, rubs, or gallops. Distal pulses are 2+ and symmetric.  Pulmonary/Chest: No respiratory distress. Clear to auscultation bilaterally. No wheezing or rales.  Abdominal: Soft and non-distended.  There is no tenderness.  No rebound, guarding, or rigidity. Good bowel sounds.  Genitourinary: No CVA tenderness  Musculoskeletal: Moves all extremities. No obvious deformities. No edema. No calf tenderness.  Right Hand: No obvious deformity. Swelling to dorsal radial side of R wrist. No erythema noted. 1.5 cm erythema to R antecubital area. Track marks over R arm. Limited ROM of wrist secondary to pain. Pain out of proportion of exam. Radial, median, and ulnar nerves are intact. Radial and ulnar pulses are 2+. Normal capillary refill.  Distal sensation is intact.  Skin: Warm and dry.  Neurological:  Alert, awake, and appropriate.  Normal speech.  No acute focal neurological deficits are appreciated.  Psychiatric: Normal affect. Good eye contact. Appropriate in content.    ED Course    Procedures  ED Vital Signs:  Vitals:    07/19/18 1541 07/19/18 1735   BP: (!) 148/86 (!) 158/96   Pulse: 84 86   Resp:  "18 (!) 24   Temp: 97.7 °F (36.5 °C)    SpO2: 100% 100%   Weight: 70.3 kg (155 lb)    Height: 5' 6" (1.676 m)        Abnormal Lab Results:  Labs Reviewed   COMPREHENSIVE METABOLIC PANEL - Abnormal; Notable for the following:        Result Value    Potassium 3.3 (*)     AST 45 (*)     All other components within normal limits   SEDIMENTATION RATE - Abnormal; Notable for the following:     Sed Rate 28 (*)     All other components within normal limits   CBC W/ AUTO DIFFERENTIAL   C-REACTIVE PROTEIN   PROTIME-INR   APTT   RAPID HIV   HEPATITIS PANEL, ACUTE   TYPE AND SCREEN PREOP        All Lab Results:  Results for orders placed or performed during the hospital encounter of 07/19/18   CBC auto differential   Result Value Ref Range    WBC 9.13 3.90 - 12.70 K/uL    RBC 4.90 4.60 - 6.20 M/uL    Hemoglobin 14.3 14.0 - 18.0 g/dL    Hematocrit 41.3 40.0 - 54.0 %    MCV 84 82 - 98 fL    MCH 29.2 27.0 - 31.0 pg    MCHC 34.6 32.0 - 36.0 g/dL    RDW 13.4 11.5 - 14.5 %    Platelets 267 150 - 350 K/uL    MPV 9.2 9.2 - 12.9 fL    Gran # (ANC) 5.7 1.8 - 7.7 K/uL    Lymph # 2.8 1.0 - 4.8 K/uL    Mono # 0.6 0.3 - 1.0 K/uL    Eos # 0.0 0.0 - 0.5 K/uL    Baso # 0.03 0.00 - 0.20 K/uL    Gran% 62.3 38.0 - 73.0 %    Lymph% 30.7 18.0 - 48.0 %    Mono% 6.7 4.0 - 15.0 %    Eosinophil% 0.3 0.0 - 8.0 %    Basophil% 0.3 0.0 - 1.9 %    Differential Method Automated    Comprehensive metabolic panel   Result Value Ref Range    Sodium 136 136 - 145 mmol/L    Potassium 3.3 (L) 3.5 - 5.1 mmol/L    Chloride 101 95 - 110 mmol/L    CO2 23 23 - 29 mmol/L    Glucose 100 70 - 110 mg/dL    BUN, Bld 9 6 - 20 mg/dL    Creatinine 0.9 0.5 - 1.4 mg/dL    Calcium 9.7 8.7 - 10.5 mg/dL    Total Protein 8.4 6.0 - 8.4 g/dL    Albumin 3.9 3.5 - 5.2 g/dL    Total Bilirubin 0.5 0.1 - 1.0 mg/dL    Alkaline Phosphatase 84 55 - 135 U/L    AST 45 (H) 10 - 40 U/L    ALT 36 10 - 44 U/L    Anion Gap 12 8 - 16 mmol/L    eGFR if African American >60 >60 mL/min/1.73 m^2    eGFR if " non African American >60 >60 mL/min/1.73 m^2   Sedimentation rate   Result Value Ref Range    Sed Rate 28 (H) 0 - 10 mm/Hr   C-reactive protein   Result Value Ref Range    CRP 7.0 0.0 - 8.2 mg/L   Protime-INR   Result Value Ref Range    Prothrombin Time 10.7 9.0 - 12.5 sec    INR 1.0 0.8 - 1.2   APTT   Result Value Ref Range    aPTT 29.6 21.0 - 32.0 sec   Type And Screen Preop   Result Value Ref Range    Group & Rh B POS     Indirect Maliha NEG          Imaging Results:  Imaging Results          MRI Wrist Joint W WO Contrast Right (In process)  Result time 07/19/18 18:02:29   Procedure changed from MRI Wrist Joint With Contrast Right                X-Ray Chest 1 View (Final result)  Result time 07/19/18 17:31:19    Final result by Teofilo Jones MD (07/19/18 17:31:19)                 Impression:      1.  Negative for acute process involving the chest.    2.  Incidental findings as noted above.      Electronically signed by: Teofilo Jones MD  Date:    07/19/2018  Time:    17:31             Narrative:    EXAMINATION:  XR CHEST 1 VIEW    CLINICAL HISTORY:  Pain in unspecified wrist    COMPARISON:  No comparison studies are available.    FINDINGS:  The lungs are clear. The cardiac silhouette size is normal. The trachea is midline and the mediastinal width is normal. Negative for focal infiltrate, effusion or pneumothorax. Pulmonary vasculature is normal. Negative for osseous abnormalities. Convex-left curvature of the upper thoracic spine.  Tortuous aorta.                               X-Ray Wrist Complete Right (Final result)  Result time 07/19/18 16:21:21    Final result by LEONID Narvaez Sr., MD (07/19/18 16:21:21)                 Impression:      1. There are mild osteoarthritic changes in the radiocarpal joint.  2. There is mild prominence of the soft tissue thickness dorsal to the right wrist.  3. If additional imaging evaluation is clinically indicated, I recommend consideration of an MRI examination of the  right wrist without IV contrast.      Electronically signed by: Nabil Narvaez MD  Date:    07/19/2018  Time:    16:21             Narrative:    EXAMINATION:  XR WRIST COMPLETE 3 VIEWS RIGHT    CLINICAL HISTORY:  Pain in unspecified wrist    COMPARISON:  None    FINDINGS:  There is no acute fracture visualized.  There are mild osteoarthritic changes in the radiocarpal joint.  There is no dislocation.  There is mild prominence of the soft tissue thickness dorsal to the right wrist.                               X-Ray Elbow Complete Right (Final result)  Result time 07/19/18 16:18:51    Final result by LEONID Narvaez Sr., MD (07/19/18 16:18:51)                 Impression:      1. There is a 7 mm curvilinear radiopaque object in the soft tissue of the cubital fossa.   This is characteristic of a foreign body.  2. There is no radiographic evidence of osteomyelitis.      Electronically signed by: Nabil Narvaez MD  Date:    07/19/2018  Time:    16:18             Narrative:    EXAMINATION:  XR ELBOW COMPLETE 3 VIEW RIGHT    CLINICAL HISTORY:  Cutaneous abscess, unspecified    COMPARISON:  None    FINDINGS:  There is no fracture. There is no dislocation.  There is a 7 mm curvilinear radiopaque object in the soft tissue of the cubital fossa.  There is no radiographic evidence of osteomyelitis.                                        The Emergency Provider reviewed the vital signs and test results, which are outlined above.    ED Discussion     4:51 PM: Jonathan Woods NP discussed the pt's case with Dr. Winn (Orthopedic) who recommends admitting patient and he will come by and evaluate pt. Dr. Winn recommends getting MRI and pre-op labs.    8:56 PM: Discussed case with Dr. Winn (Orthopedics). Dr. Winn agrees with current care and management of pt and accepts admission.   Admitting Service: Hospital medicine   Admitting Physician: Dr. Winn  Admit to: Surgery    8:56 PM: Re-evaluated pt. I have discussed test  results, shared treatment plan, and the need for admission with patient and family at bedside. Pt and family express understanding at this time and agree with all information. All questions answered. Pt and family have no further questions or concerns at this time. Pt is ready for admit.       ED Medication(s):  Medications   lidocaine (PF) 10 mg/ml (1%) injection 100 mg (100 mg Infiltration Given by Other 7/19/18 1649)   morphine injection 4 mg (4 mg Intravenous Given 7/19/18 1647)   ondansetron injection 4 mg (4 mg Intravenous Given 7/19/18 1647)   morphine injection 6 mg (6 mg Intravenous Given 7/19/18 1754)   lorazepam (ATIVAN) injection 1 mg (1 mg Intravenous Given 7/19/18 1849)   hydromorphone (PF) injection 1 mg (1 mg Intravenous Given 7/19/18 1850)   gadobutrol 7 mL (7 mLs Intravenous Given 7/19/18 1840)       New Prescriptions    No medications on file             Medical Decision Making    Medical Decision Making:   Clinical Tests:   Lab Tests: Ordered and Reviewed  Radiological Study: Ordered and Reviewed           Scribe Attestation:   Scribe #1: I performed the above scribed service and the documentation accurately describes the services I performed. I attest to the accuracy of the note.    Attending:   Physician Attestation Statement for Scribe #1: I, Jonathan Woods NP, personally performed the services described in this documentation, as scribed by Annie Torres, in my presence, and it is both accurate and complete.          Clinical Impression       ICD-10-CM ICD-9-CM   1. Wrist pain M25.539 719.43   2. Abscess L02.91 682.9       Disposition:   Disposition: Admitted  Condition: Serious         Jonathan Woods Jr., [facility]  07/26/18 1138       Jonathan Woods Jr., [facility]  07/26/18 1139

## 2018-07-19 NOTE — Clinical Note
Pt sent to surgery for possible septic joint, notified HM of patient. Orthopedics will admit after surgery if necessary.

## 2018-07-20 PROBLEM — E87.6 HYPOKALEMIA: Status: ACTIVE | Noted: 2018-07-20

## 2018-07-20 LAB
GRAM STN SPEC: NORMAL
HAV IGM SERPL QL IA: NEGATIVE
HBV CORE IGM SERPL QL IA: NEGATIVE
HBV SURFACE AG SERPL QL IA: NEGATIVE
HCV AB SERPL QL IA: POSITIVE
HIV1+2 IGG SERPL QL IA.RAPID: NEGATIVE

## 2018-07-20 RX ORDER — POTASSIUM CHLORIDE 20 MEQ/1
40 TABLET, EXTENDED RELEASE ORAL ONCE
Status: DISCONTINUED | OUTPATIENT
Start: 2018-07-20 | End: 2018-07-20 | Stop reason: HOSPADM

## 2018-07-20 NOTE — ASSESSMENT & PLAN NOTE
- R wrist infection s/p Irrigation and debridement right wrist abscess with debridement of necrotic carpal bone, POD#1  - Dressing changes and pain control per primary team  - Patient currently on Unasyn  - Would recommend MRSA coverage pending culture results

## 2018-07-20 NOTE — PROGRESS NOTES
Patient requesting to leave AMA. Informed Natty Glez NP and Dr. Winn via secure chat. Risks and benefits explained to patient. Patient verbalized understanding. Patient signed AMA paper. IV removed. Patient girlfriend present.

## 2018-07-20 NOTE — H&P
[facility] -   Orthopedics  History and Physical     Patient Name: Dominic De Jesus Jr.  MRN: [id number]  Admission Date: 7/19/2018  Hospital Length of Stay: 0 days  Attending Provider: No att. providers found  Primary Care Provider: Primary Doctor No     Patient information was obtained from patient and ER records.      Consults  Subjective:      Principal Problem:<principal problem not specified>     Chief Complaint:        Chief Complaint   Patient presents with    Abscess       c/o abscess to right AC, patient states pain and swelling following IV drug use         HPI:  42-year-old male who will consult and also a chief complaint of right wrist pain, states the pain began approximately this morning sudden onset worsening since also reports history of needle being broken off in the right antecubital fossa the same side of the patient's wrist complaints.  Still unsure of when that happened maybe a day.  The patient reports intense wrist pain and inability to move his wrist lot of pain we were consulted by the emergency room for evaluation. Patient denies any other pain dizziness nausea vomiting.  Positive for joint pain loss of range of motion.     History reviewed. No pertinent past medical history.     History reviewed. No pertinent surgical history.     Review of patient's allergies indicates:  No Known Allergies     No current facility-administered medications for this encounter.            Current Outpatient Prescriptions   Medication Sig    diclofenac (VOLTAREN) 50 MG EC tablet Take 1 tablet (50 mg total) by mouth 2 (two) times daily.          Family History      None                 Social History Main Topics    Smoking status: Current Every Day Smoker       Packs/day: 0.50       Types: Cigarettes    Smokeless tobacco: Not on file    Alcohol use No    Drug use: Yes       Types: IV         Comment: heroin    Sexual activity: Not on file      Review of Systems   Constitution: Negative  "for chills, decreased appetite, diaphoresis and weakness.   HENT: Negative for ear discharge and ear pain.    Eyes: Negative for blurred vision, double vision and pain.   Cardiovascular: Negative for chest pain, claudication and dyspnea on exertion.   Respiratory: Negative for cough, hemoptysis and shortness of breath.    Skin: Negative for color change and nail changes.   Musculoskeletal: Positive for joint pain and joint swelling.   Gastrointestinal: Negative for bloating, abdominal pain and anorexia.   Genitourinary: Negative for bladder incontinence and flank pain.   Neurological: Negative for dizziness, headaches, numbness and paresthesias.   Psychiatric/Behavioral: Negative for altered mental status and memory loss.      Objective:      Vital Signs (Most Recent):  Temp: 97.7 °F (36.5 °C) (07/19/18 1541)  Pulse: 86 (07/19/18 1735)  Resp: (!) 24 (07/19/18 1735)  BP: (!) 158/96 (07/19/18 1735)  SpO2: 100 % (07/19/18 1735) Vital Signs (24h Range):  Temp:  [97.7 °F (36.5 °C)] 97.7 °F (36.5 °C)  Pulse:  [84-86] 86  Resp:  [18-24] 24  SpO2:  [100 %] 100 %  BP: (148-158)/(86-96) 158/96      Weight: 70.3 kg (155 lb)  Height: 5' 6" (167.6 cm)  Body mass index is 25.02 kg/m².     No intake or output data in the 24 hours ending 07/19/18 1804     General     Constitutional: He is oriented to person, place, and time. He appears well-developed and well-nourished.   HENT:   Head: Normocephalic and atraumatic.   Nose: Nose normal.   Eyes: EOM are normal. Pupils are equal, round, and reactive to light.   Cardiovascular: Normal rate and regular rhythm.    Pulmonary/Chest: Effort normal and breath sounds normal.   Abdominal: Soft. Bowel sounds are normal. He exhibits no distension. There is no tenderness.   Neurological: He is alert and oriented to person, place, and time. He has normal reflexes. A cranial nerve deficit is present.   Psychiatric: He has a normal mood and affect.            Right Hand/Wrist Exam      Inspection "   Effusion: Wrist - present   Deformity: Wrist - deformity         Left Hand/Wrist Exam   Left hand exam is normal.        Right upper extremity exam  The patient with intact EPL FPL/FDP-I, pain and limited range of motion with wrist flexion and wrist extension  Tender palpation at dorsal lateral wrist near the radial carpal junction, edema noted, slight warmth to touch  2+ RP         Imaging Results                   X-Ray Chest 1 View (Final result)  Result time 07/19/18 17:31:19                Final result by Teofilo Jones MD (07/19/18 17:31:19)                               Impression:        1.  Negative for acute process involving the chest.     2.  Incidental findings as noted above.        Electronically signed by:     Teofilo Jones MD  Date:                                   07/19/2018  Time:                                   17:31                         Narrative:     EXAMINATION:  XR CHEST 1 VIEW     CLINICAL HISTORY:  Pain in unspecified wrist     COMPARISON:  No comparison studies are available.     FINDINGS:  The lungs are clear. The cardiac silhouette size is normal. The trachea is midline and the mediastinal width is normal. Negative for focal infiltrate, effusion or pneumothorax. Pulmonary vasculature is normal. Negative for osseous abnormalities. Convex-left curvature of the upper thoracic spine.  Tortuous aorta.                                                  X-Ray Wrist Complete Right (Final result)  Result time 07/19/18 16:21:21                Final result by LEONID Narvaez Sr., MD (07/19/18 16:21:21)                               Impression:        1. There are mild osteoarthritic changes in the radiocarpal joint.  2. There is mild prominence of the soft tissue thickness dorsal to the right wrist.  3. If additional imaging evaluation is clinically indicated, I recommend consideration of an MRI examination of the right wrist without IV contrast.        Electronically signed by:     Nabil  MD Solange  Date:                                   07/19/2018  Time:                                   16:21                         Narrative:     EXAMINATION:  XR WRIST COMPLETE 3 VIEWS RIGHT     CLINICAL HISTORY:  Pain in unspecified wrist     COMPARISON:  None     FINDINGS:  There is no acute fracture visualized.  There are mild osteoarthritic changes in the radiocarpal joint.  There is no dislocation.  There is mild prominence of the soft tissue thickness dorsal to the right wrist.                                                  X-Ray Elbow Complete Right (Final result)  Result time 07/19/18 16:18:51                Final result by LEONID Narvaez Sr., MD (07/19/18 16:18:51)                               Impression:        1. There is a 7 mm curvilinear radiopaque object in the soft tissue of the cubital fossa.   This is characteristic of a foreign body.  2. There is no radiographic evidence of osteomyelitis.        Electronically signed by:     Nabil Narvaez MD  Date:                                   07/19/2018  Time:                                   16:18                         Narrative:     EXAMINATION:  XR ELBOW COMPLETE 3 VIEW RIGHT     CLINICAL HISTORY:  Cutaneous abscess, unspecified     COMPARISON:  None     FINDINGS:  There is no fracture. There is no dislocation.  There is a 7 mm curvilinear radiopaque object in the soft tissue of the cubital fossa.  There is no radiographic evidence of osteomyelitis.                                        MRI in progress     WBC 9.13, hemoglobin 14.3, hematocrit 41.3, platelets 273     Sodium 3.6, potassium 3.3 low, chloride 101, CO2 23, BUN 9, creatinine 0.9, glucose 100     CRP 7.8 now     Sed rate pending        Significant Labs: All pertinent labs within the past 24 hours have been reviewed.     Significant Imaging: X-Ray: I have reviewed all pertinent results/findings and my personal findings are:  [phone]     Assessment/Plan:      42-year-old male  with right swollen wrist pain reduced range of motion and a foreign body in right antecubital fossa  1.  Patient undergo MRI without contrast of the right wrist  2.  Patient last ate at noon plan for OR with probable irrigation debridement washout of right wrist along with exploration removal foreign body right antecubital fossa  3.  Remain in npo  4.  Follow-up MRIs also discussed with Dr. Winn for further recommendations  There are no hospital problems to display for this patient.        Thank you for your consult. I will follow-up with patient. Please contact us if you have any additional questions.     Kuldeep Juan PA-C  Orthopedics  [facility]            Revision History

## 2018-07-20 NOTE — ANESTHESIA POSTPROCEDURE EVALUATION
"Anesthesia Post Evaluation    Patient: Dominic De Jesus JrPatsy    Procedure(s) Performed: Procedure(s) (LRB):  INCISION AND DRAINAGE,UPPER EXTREMITY (Right)  REMOVAL, FOREIGN BODY, UPPER EXTREMITY (Right)    Final Anesthesia Type: general  Patient location during evaluation: PACU  Patient participation: No - Unable to Participate, Other Reason (see comments)  Level of consciousness: awake and agitated  Post-procedure vital signs: reviewed and stable  Pain management: adequate  Airway patency: patent  PONV status at discharge: No PONV  Anesthetic complications: no      Cardiovascular status: blood pressure returned to baseline  Respiratory status: unassisted  Hydration status: euvolemic  Follow-up not needed.        Visit Vitals  BP (!) 156/89   Pulse (!) 133   Temp 37.7 °C (99.9 °F) (Temporal)   Resp (!) 23   Ht 5' 6" (1.676 m)   Wt 70.3 kg (155 lb)   SpO2 (!) 90%   BMI 25.02 kg/m²       Pain/Nicole Score: Pain Rating Prior to Med Admin: 10 (7/19/2018  6:50 PM)      "

## 2018-07-20 NOTE — ANESTHESIA PREPROCEDURE EVALUATION
07/19/2018  Dominic De Jesus Jr. is a 42 y.o., male.    Anesthesia Evaluation    I have reviewed the Patient Summary Reports.        Review of Systems  Anesthesia Hx:  No problems with previous Anesthesia   Denies Personal Hx of Anesthesia complications.   Social:  Smoker    Hematology/Oncology:  Hematology Normal        Cardiovascular:  Cardiovascular Normal Exercise tolerance: good   Functional Capacity 4 METS    Pulmonary:  Pulmonary Normal  Denies Pulmonary Symptoms.    Renal/:  Renal/ Normal     Hepatic/GI:  Hepatic/GI Normal    Musculoskeletal:  Musculoskeletal Normal    Neurological:  Neurology Normal    Endocrine:  Denies Diabetes    Dermatological:  Left hand swelling      Physical Exam  General:  Well nourished    Airway/Jaw/Neck:  Airway Findings: Mouth Opening: Normal      Chest/Lungs:  Chest/Lungs Findings:    Heart/Vascular:  Heart Findings: Rate: Normal        Mental Status:  Mental Status Findings:  Combative, Anxious         Anesthesia Plan  Type of Anesthesia, risks & benefits discussed:  Anesthesia Type:  general  Patient's Preference:   Intra-op Monitoring Plan:   Intra-op Monitoring Plan Comments:   Post Op Pain Control Plan:   Post Op Pain Control Plan Comments:   Induction:   IV  Beta Blocker:         Informed Consent: Patient understands risks and agrees with Anesthesia plan.  Questions answered. Anesthesia consent signed with patient.  ASA Score: 3  emergent   Day of Surgery Review of History & Physical:            Ready For Surgery From Anesthesia Perspective.

## 2018-07-20 NOTE — CONSULTS
[facility] Medicine  Consult Note    Patient Name: Dominic De Jesus Jr.  MRN: [id number]  Admission Date: 7/19/2018  Hospital Length of Stay: 1 days  Attending Physician: LISA Winn MD   Primary Care Provider: Primary Doctor No           Patient information was obtained from patient and ER records.     Consults  Subjective:     Principal Problem: Pain and swelling of right wrist    Chief Complaint:   Chief Complaint   Patient presents with    Abscess     c/o abscess to right AC, patient states pain and swelling following IV drug use        HPI: 42-year-old male who will consult and also a chief complaint of right wrist pain, states the pain began approximately this morning sudden onset worsening since also reports history of needle being broken off in the right antecubital fossa the same side of the patient's wrist complaints.   He patient reports intense wrist pain and inability to move his wrist lot of pain.  Orthopedic surgery called for admission. XR of R wrist show no acute fx, there is mild prominence of soft tissue thickness dorsal to right wrist.  MRI wrist ordered.  Patient taken to OR for I/D of abscess and removal of necrotic carpal bone.  Patient currently on Unasyn.  Wound cultures are pending.  Hospital medicine consulted for medical management.     History reviewed. No pertinent past medical history.    History reviewed. No pertinent surgical history.    Review of patient's allergies indicates:  No Known Allergies    No current facility-administered medications on file prior to encounter.      Current Outpatient Prescriptions on File Prior to Encounter   Medication Sig    diclofenac (VOLTAREN) 50 MG EC tablet Take 1 tablet (50 mg total) by mouth 2 (two) times daily.     Family History     None        Social History Main Topics    Smoking status: Current Every Day Smoker     Packs/day: 0.50     Types: Cigarettes    Smokeless tobacco: Not on file    Alcohol  use No    Drug use: Yes     Types: IV      Comment: heroin    Sexual activity: Not on file     Review of Systems   Constitutional: Negative for activity change, appetite change, chills, diaphoresis, fatigue and fever.   HENT: Negative for facial swelling, sore throat, tinnitus and trouble swallowing.    Eyes: Negative for photophobia and visual disturbance.   Respiratory: Negative for apnea, cough, chest tightness, shortness of breath and wheezing.    Cardiovascular: Negative for chest pain, palpitations and leg swelling.   Gastrointestinal: Negative for abdominal distention, abdominal pain, constipation, diarrhea, nausea and vomiting.   Endocrine: Negative for polydipsia, polyphagia and polyuria.   Genitourinary: Negative for decreased urine volume, dysuria, flank pain, frequency and hematuria.   Musculoskeletal: Positive for arthralgias and joint swelling. Negative for back pain, myalgias and neck stiffness.   Skin: Negative for pallor and rash.   Allergic/Immunologic: Negative for immunocompromised state.   Neurological: Negative for dizziness, seizures, syncope, weakness, numbness and headaches.   Psychiatric/Behavioral: Negative for confusion, hallucinations and suicidal ideas. The patient is not nervous/anxious.    All other systems reviewed and are negative.    Objective:     Vital Signs (Most Recent):  Temp: 98 °F (36.7 °C) (07/19/18 2300)  Pulse: 105 (07/19/18 2300)  Resp: (!) 24 (07/19/18 2300)  BP: 126/75 (07/19/18 2300)  SpO2: 97 % (07/19/18 2300) Vital Signs (24h Range):  Temp:  [97.7 °F (36.5 °C)-99.9 °F (37.7 °C)] 98 °F (36.7 °C)  Pulse:  [] 105  Resp:  [18-32] 24  SpO2:  [90 %-100 %] 97 %  BP: (126-158)/() 126/75     Weight: 68.6 kg (151 lb 3.8 oz)  Body mass index is 24.41 kg/m².    Physical Exam   Constitutional: He is oriented to person, place, and time. He appears well-developed and well-nourished. No distress.   HENT:   Head: Normocephalic and atraumatic.   Mouth/Throat: Oropharynx  is clear and moist.   Eyes: Conjunctivae are normal. Pupils are equal, round, and reactive to light. No scleral icterus.   Neck: No JVD present. No thyromegaly present.   Cardiovascular: Regular rhythm.  Exam reveals no gallop and no friction rub.    No murmur heard.  Pulmonary/Chest: Effort normal and breath sounds normal. No respiratory distress. He has no wheezes. He has no rales.   Abdominal: Soft. Bowel sounds are normal. He exhibits no distension. There is no tenderness. There is no guarding.   Musculoskeletal: Normal range of motion.   RUE dressing intact   Neurological: He is alert and oriented to person, place, and time. No cranial nerve deficit.   Skin: Skin is warm. Capillary refill takes 2 to 3 seconds. He is not diaphoretic. No erythema.   Psychiatric: He has a normal mood and affect.   Nursing note and vitals reviewed.      Significant Labs:   CBC:   Recent Labs  Lab 07/19/18  1655   WBC 9.13   HGB 14.3   HCT 41.3        CMP:   Recent Labs  Lab 07/19/18  1655      K 3.3*      CO2 23      BUN 9   CREATININE 0.9   CALCIUM 9.7   PROT 8.4   ALBUMIN 3.9   BILITOT 0.5   ALKPHOS 84   AST 45*   ALT 36   ANIONGAP 12   EGFRNONAA >60     All pertinent labs within the past 24 hours have been reviewed.    Significant Imaging: I have reviewed all pertinent imaging results/findings within the past 24 hours.   Imaging Results          MRI Wrist Joint W WO Contrast Right (In process)  Result time 07/19/18 18:02:29   Procedure changed from MRI Wrist Joint With Contrast Right                X-Ray Chest 1 View (Final result)  Result time 07/19/18 17:31:19    Final result by Teofilo Jones MD (07/19/18 17:31:19)                 Impression:      1.  Negative for acute process involving the chest.    2.  Incidental findings as noted above.      Electronically signed by: Teofilo Jones MD  Date:    07/19/2018  Time:    17:31             Narrative:    EXAMINATION:  XR CHEST 1 VIEW    CLINICAL  HISTORY:  Pain in unspecified wrist    COMPARISON:  No comparison studies are available.    FINDINGS:  The lungs are clear. The cardiac silhouette size is normal. The trachea is midline and the mediastinal width is normal. Negative for focal infiltrate, effusion or pneumothorax. Pulmonary vasculature is normal. Negative for osseous abnormalities. Convex-left curvature of the upper thoracic spine.  Tortuous aorta.                               X-Ray Wrist Complete Right (Final result)  Result time 07/19/18 16:21:21    Final result by LEONID Narvaez Sr., MD (07/19/18 16:21:21)                 Impression:      1. There are mild osteoarthritic changes in the radiocarpal joint.  2. There is mild prominence of the soft tissue thickness dorsal to the right wrist.  3. If additional imaging evaluation is clinically indicated, I recommend consideration of an MRI examination of the right wrist without IV contrast.      Electronically signed by: Nabil Narvaez MD  Date:    07/19/2018  Time:    16:21             Narrative:    EXAMINATION:  XR WRIST COMPLETE 3 VIEWS RIGHT    CLINICAL HISTORY:  Pain in unspecified wrist    COMPARISON:  None    FINDINGS:  There is no acute fracture visualized.  There are mild osteoarthritic changes in the radiocarpal joint.  There is no dislocation.  There is mild prominence of the soft tissue thickness dorsal to the right wrist.                               X-Ray Elbow Complete Right (Final result)  Result time 07/19/18 16:18:51    Final result by LEONID Narvaez Sr., MD (07/19/18 16:18:51)                 Impression:      1. There is a 7 mm curvilinear radiopaque object in the soft tissue of the cubital fossa.   This is characteristic of a foreign body.  2. There is no radiographic evidence of osteomyelitis.      Electronically signed by: Nabil Narvaez MD  Date:    07/19/2018  Time:    16:18             Narrative:    EXAMINATION:  XR ELBOW COMPLETE 3 VIEW RIGHT    CLINICAL  HISTORY:  Cutaneous abscess, unspecified    COMPARISON:  None    FINDINGS:  There is no fracture. There is no dislocation.  There is a 7 mm curvilinear radiopaque object in the soft tissue of the cubital fossa.  There is no radiographic evidence of osteomyelitis.                                  Assessment/Plan:     * Pain and swelling of right wrist    - R wrist infection s/p Irrigation and debridement right wrist abscess with debridement of necrotic carpal bone, POD#1  - Dressing changes and pain control per primary team  - Patient currently on Unasyn  - Would recommend MRSA coverage pending culture results        Hypokalemia    - give Klorcon 40mEq x 1              VTE Risk Mitigation         Ordered     IP VTE LOW RISK PATIENT  Once      07/19/18 2307     Place LOLY hose  Until discontinued      07/19/18 2307     Place sequential compression device  Until discontinued      07/19/18 2307              Thank you for your consult. I will follow-up with patient. Please contact us if you have any additional questions.    Sigifredo Ferraro MD  Department of Hospital Medicine   [facility]

## 2018-07-20 NOTE — OP NOTE
Operative report    Patient name:  Dominic De Jesus  Date of birth 12/14/2075  Date of surgery 7/19/2018    Surgeon:  Cristóbal Winn  Asst: Kuldeep Juan  Preop diagnosis:  Right wrist abscess, IV drug use,    Postop diagnosis same    Procedure:  Irrigation and debridement right wrist abscess with debridement of necrotic carpal bone    Estimated blood loss 25 cc    Tourniquet time:  24 min    Findings:  Plus intra-articular in the wrist joint and scapholunate joint with necrotic carpal bone fragments removed    Specimens multiple including to both microbiology as well as to pathology next    Complications none    Anesthesia general endotracheal anesthesia    Indications 42-year-old gentleman with right wrist abscess in the setting of IV drug use with magnetic resonance imaging showing edema but no definite abscess indicated for surgical irrigation and debridement.  Consent was obtained after risks benefits and alternatives were discussed with the patient including bleeding infection pain nerve injury blood vessel injury an adequate debridement severe post infectious arthritis RSD as well as the need for amputation and multiple additional surgeries an consent was placed on the chart and we proceeded.    Procedure the patient was brought to the operating room and placed on a regular table with an arm board and was spun 90°.  The patient was intubated by anesthesia please see their notes for further details.  The entire right upper extremity was prepped and draped in the usual sterile manner all the way up to the armpit and a time-out was placed.  Surgical anesthesia and nursing teams agreed on the name patient procedure side site scope and then we proceeded.  The arm was exsanguinated by gravity and the tourniquet was inflated high on the right arm.  Total tourniquet time was 24 min.  An incision was 1st made on the right antecubital fossa over a clear site injection and no obvious pus was identified.  X-rays were taken and no definite evidence of any foreign body was noted at the site of where this gentleman had been injecting himself in his right antecubital fossa.    We then did our turned our attention distally to the wrist and a 4 cm dorsal incision was made over the 3rd extensor compartment over the dorsal carpal tubercle. We then took our incision down through the dorsal tissues carefully protecting any tendons and veins down to the dorsal wrist capsule.  An arthrotomy was then made into the dorsal wrist joint longitudinally and copious amounts of pus were evacuated.  Cultures were obtained were obtained multiple times and necrotic dead bone fragments were removed. These were sent for pathology we then used x-ray to ensure that we had evacuated the entire radial aspect as well as ulnar aspects of the wrist joint as well as the scapholunate joint all pus pockets were completely evacuated with necrotic synovium debrided extensively and 2 L of antibiotic soak solution were then irrigated through the wound and joint profusely.    A Penrose drain quarter-inch was then cut fashion and placed deep within the wrist joint and the wound was left open to drain. We let the tourniquet down for total tourniquet time of 24 min and hemostasis was meticulous.    Dressings were then placed by usual sterile technique and a volar wrist splint in intrinsic plus position was then placed for support with an Ace bandage wrapped on top.  The patient was then awakened and transferred to the recovery room in stable condition.  There are no immediate complications noted I was present for the entire portion of the procedure and the use of a physician assistant was vital to the performance this procedure in a safe and timely manner.    Postop disposition:  This gentleman is recommended to be admitted for IV antibiotics with infectious disease as well as Hospital Medicine as well as Psychiatry and  consult.  We recommend  that he receive 24-48 hours of antibiotics per Infectious Disease until the cultures are obtained and any recommendations for antibiotic delivery will be per the Infectious Disease service.  We recommend strict elevation of the arm at all times and careful wound changes otherwise he is likely to have recurrence or residual infection that may require more surgery.     I agree with the above dictation as I have confirmed all this to be accurate

## 2018-07-20 NOTE — PLAN OF CARE
Pt restless on stretcher s/p I&D rt arm performed under general anesthesia by Dr. Winn. Pt arrived to PACU accompanied by multiple OR staff personnel.  Respirations even and unlabored on room air and tolerating well. VSS. Neurovascular checks intact. Will cont to monitor.

## 2018-07-20 NOTE — HPI
42-year-old male who will consult and also a chief complaint of right wrist pain, states the pain began approximately this morning sudden onset worsening since also reports history of needle being broken off in the right antecubital fossa the same side of the patient's wrist complaints.   He patient reports intense wrist pain and inability to move his wrist lot of pain.  Orthopedic surgery called for admission. XR of R wrist show no acute fx, there is mild prominence of soft tissue thickness dorsal to right wrist.  MRI wrist ordered.  Patient taken to OR for I/D of abscess and removal of necrotic carpal bone.  Patient currently on Unasyn.  Wound cultures are pending.  Hospital medicine consulted for medical management.

## 2018-07-20 NOTE — DISCHARGE INSTRUCTIONS
General Information:    1. Do not drink alcoholic beverages including beer for 24 hours or as long as you are on pain medication..  2. Do not drive a motor vehicle, operate machinery or power tools, or signs legal papers for 24 hours or as long as you are on pain medication.   3. You may experience light-headedness, dizziness, and sleepiness following surgery. Please do not stay alone. A responsible adult should be with you for this 24 hour period.  4. Go home and rest.  5. Progress slowly to a normal diet unless instructed.  Otherwise, begin with liquids such as soft drinks, then soup and crackers working up to solid foods. Drink plenty of nonalcoholic fluids.  6. Certain anesthetics and pain medications produce nausea and vomiting in certain individuals. If nausea becomes a problem at home, call you doctor.  7. A nurse will be calling you sometime after surgery. Do not be alarmed. This is our way of finding out how you are doing.  8. Several times every hour while you are awake, take 2-3 deep breaths and cough. If you had stomach surgery hold a pillow or rolled towel firmly against your stomach before you cough. This will help with any pain the cough might cause.  9. Several times every hour while you are awake, pump and flex your feet 5-6 times and do foot circles. This will help prevent blood clots.  10. Call your doctor for severe pain, bleeding, fever, or signs or symptoms of infection (pain, swelling, redness, foul odor, drainage).  11. You can contact your doctor anytime by [phone] for the [facility] (at [facility]) or [phone] for the [facility] on [facility].   [email or web address] is another way to contact your doctor if you are an active participant online with [facility].  12. Continue Nozin provided at discharge twice daily for 7 days or until the incision is healed.  See pamphlet or box for instructions.

## 2018-07-20 NOTE — SUBJECTIVE & OBJECTIVE
History reviewed. No pertinent past medical history.    History reviewed. No pertinent surgical history.    Review of patient's allergies indicates:  No Known Allergies    No current facility-administered medications on file prior to encounter.      Current Outpatient Prescriptions on File Prior to Encounter   Medication Sig    diclofenac (VOLTAREN) 50 MG EC tablet Take 1 tablet (50 mg total) by mouth 2 (two) times daily.     Family History     None        Social History Main Topics    Smoking status: Current Every Day Smoker     Packs/day: 0.50     Types: Cigarettes    Smokeless tobacco: Not on file    Alcohol use No    Drug use: Yes     Types: IV      Comment: heroin    Sexual activity: Not on file     Review of Systems   Constitutional: Negative for activity change, appetite change, chills, diaphoresis, fatigue and fever.   HENT: Negative for facial swelling, sore throat, tinnitus and trouble swallowing.    Eyes: Negative for photophobia and visual disturbance.   Respiratory: Negative for apnea, cough, chest tightness, shortness of breath and wheezing.    Cardiovascular: Negative for chest pain, palpitations and leg swelling.   Gastrointestinal: Negative for abdominal distention, abdominal pain, constipation, diarrhea, nausea and vomiting.   Endocrine: Negative for polydipsia, polyphagia and polyuria.   Genitourinary: Negative for decreased urine volume, dysuria, flank pain, frequency and hematuria.   Musculoskeletal: Positive for arthralgias and joint swelling. Negative for back pain, myalgias and neck stiffness.   Skin: Negative for pallor and rash.   Allergic/Immunologic: Negative for immunocompromised state.   Neurological: Negative for dizziness, seizures, syncope, weakness, numbness and headaches.   Psychiatric/Behavioral: Negative for confusion, hallucinations and suicidal ideas. The patient is not nervous/anxious.    All other systems reviewed and are negative.    Objective:     Vital Signs (Most  Recent):  Temp: 98 °F (36.7 °C) (07/19/18 2300)  Pulse: 105 (07/19/18 2300)  Resp: (!) 24 (07/19/18 2300)  BP: 126/75 (07/19/18 2300)  SpO2: 97 % (07/19/18 2300) Vital Signs (24h Range):  Temp:  [97.7 °F (36.5 °C)-99.9 °F (37.7 °C)] 98 °F (36.7 °C)  Pulse:  [] 105  Resp:  [18-32] 24  SpO2:  [90 %-100 %] 97 %  BP: (126-158)/() 126/75     Weight: 68.6 kg (151 lb 3.8 oz)  Body mass index is 24.41 kg/m².    Physical Exam   Constitutional: He is oriented to person, place, and time. He appears well-developed and well-nourished. No distress.   HENT:   Head: Normocephalic and atraumatic.   Mouth/Throat: Oropharynx is clear and moist.   Eyes: Conjunctivae are normal. Pupils are equal, round, and reactive to light. No scleral icterus.   Neck: No JVD present. No thyromegaly present.   Cardiovascular: Regular rhythm.  Exam reveals no gallop and no friction rub.    No murmur heard.  Pulmonary/Chest: Effort normal and breath sounds normal. No respiratory distress. He has no wheezes. He has no rales.   Abdominal: Soft. Bowel sounds are normal. He exhibits no distension. There is no tenderness. There is no guarding.   Musculoskeletal: Normal range of motion.   RUE dressing intact   Neurological: He is alert and oriented to person, place, and time. No cranial nerve deficit.   Skin: Skin is warm. Capillary refill takes 2 to 3 seconds. He is not diaphoretic. No erythema.   Psychiatric: He has a normal mood and affect.   Nursing note and vitals reviewed.      Significant Labs:   CBC:   Recent Labs  Lab 07/19/18  1655   WBC 9.13   HGB 14.3   HCT 41.3        CMP:   Recent Labs  Lab 07/19/18  1655      K 3.3*      CO2 23      BUN 9   CREATININE 0.9   CALCIUM 9.7   PROT 8.4   ALBUMIN 3.9   BILITOT 0.5   ALKPHOS 84   AST 45*   ALT 36   ANIONGAP 12   EGFRNONAA >60     All pertinent labs within the past 24 hours have been reviewed.    Significant Imaging: I have reviewed all pertinent imaging  results/findings within the past 24 hours.   Imaging Results          MRI Wrist Joint W WO Contrast Right (In process)  Result time 07/19/18 18:02:29   Procedure changed from MRI Wrist Joint With Contrast Right                X-Ray Chest 1 View (Final result)  Result time 07/19/18 17:31:19    Final result by Teofilo Jones MD (07/19/18 17:31:19)                 Impression:      1.  Negative for acute process involving the chest.    2.  Incidental findings as noted above.      Electronically signed by: Teofilo Jones MD  Date:    07/19/2018  Time:    17:31             Narrative:    EXAMINATION:  XR CHEST 1 VIEW    CLINICAL HISTORY:  Pain in unspecified wrist    COMPARISON:  No comparison studies are available.    FINDINGS:  The lungs are clear. The cardiac silhouette size is normal. The trachea is midline and the mediastinal width is normal. Negative for focal infiltrate, effusion or pneumothorax. Pulmonary vasculature is normal. Negative for osseous abnormalities. Convex-left curvature of the upper thoracic spine.  Tortuous aorta.                               X-Ray Wrist Complete Right (Final result)  Result time 07/19/18 16:21:21    Final result by LEONID Narvaez Sr., MD (07/19/18 16:21:21)                 Impression:      1. There are mild osteoarthritic changes in the radiocarpal joint.  2. There is mild prominence of the soft tissue thickness dorsal to the right wrist.  3. If additional imaging evaluation is clinically indicated, I recommend consideration of an MRI examination of the right wrist without IV contrast.      Electronically signed by: Nabil Narvaez MD  Date:    07/19/2018  Time:    16:21             Narrative:    EXAMINATION:  XR WRIST COMPLETE 3 VIEWS RIGHT    CLINICAL HISTORY:  Pain in unspecified wrist    COMPARISON:  None    FINDINGS:  There is no acute fracture visualized.  There are mild osteoarthritic changes in the radiocarpal joint.  There is no dislocation.  There is mild prominence of  the soft tissue thickness dorsal to the right wrist.                               X-Ray Elbow Complete Right (Final result)  Result time 07/19/18 16:18:51    Final result by LEONID Narvaez Sr., MD (07/19/18 16:18:51)                 Impression:      1. There is a 7 mm curvilinear radiopaque object in the soft tissue of the cubital fossa.   This is characteristic of a foreign body.  2. There is no radiographic evidence of osteomyelitis.      Electronically signed by: Nabil Narvaez MD  Date:    07/19/2018  Time:    16:18             Narrative:    EXAMINATION:  XR ELBOW COMPLETE 3 VIEW RIGHT    CLINICAL HISTORY:  Cutaneous abscess, unspecified    COMPARISON:  None    FINDINGS:  There is no fracture. There is no dislocation.  There is a 7 mm curvilinear radiopaque object in the soft tissue of the cubital fossa.  There is no radiographic evidence of osteomyelitis.

## 2018-07-20 NOTE — TRANSFER OF CARE
"Anesthesia Transfer of Care Note    Patient: Dominic De Jesus Jr.    Procedure(s) Performed: Procedure(s) (LRB):  INCISION AND DRAINAGE,UPPER EXTREMITY (Right)  REMOVAL, FOREIGN BODY, UPPER EXTREMITY (Right)    Patient location: PACU    Anesthesia Type: general    Transport from OR: Transported from OR on room air with adequate spontaneous ventilation    Post pain: adequate analgesia    Post assessment: no apparent anesthetic complications and tolerated procedure well    Post vital signs: stable    Post-procedure mental status: Combative.    Nausea/Vomiting: no nausea/vomiting    Complications: none    Transfer of care protocol was followedComments: Dr Villa present      Last vitals:   Visit Vitals  BP (!) 156/89   Pulse (!) 133   Temp 37.7 °C (99.9 °F) (Temporal)   Resp (!) 23   Ht 5' 6" (1.676 m)   Wt 70.3 kg (155 lb)   SpO2 (!) 90%   BMI 25.02 kg/m²     "

## 2018-07-22 ENCOUNTER — ANESTHESIA (OUTPATIENT)
Dept: SURGERY | Facility: HOSPITAL | Age: 43
DRG: 872 | End: 2018-07-22
Payer: MEDICAID

## 2018-07-22 ENCOUNTER — ANESTHESIA EVENT (OUTPATIENT)
Dept: SURGERY | Facility: HOSPITAL | Age: 43
DRG: 872 | End: 2018-07-22
Payer: MEDICAID

## 2018-07-22 ENCOUNTER — HOSPITAL ENCOUNTER (INPATIENT)
Facility: HOSPITAL | Age: 43
LOS: 2 days | Discharge: LEFT BEFORE DISCHARGE | DRG: 872 | End: 2018-07-24
Attending: INTERNAL MEDICINE | Admitting: ORTHOPAEDIC SURGERY
Payer: MEDICAID

## 2018-07-22 DIAGNOSIS — M71.031 ABSCESS OF BURSA OF RIGHT WRIST: Primary | ICD-10-CM

## 2018-07-22 DIAGNOSIS — A41.9 SEPSIS: ICD-10-CM

## 2018-07-22 DIAGNOSIS — M00.9 INFECTION OF RIGHT WRIST: ICD-10-CM

## 2018-07-22 LAB
ALBUMIN SERPL BCP-MCNC: 3 G/DL
ALP SERPL-CCNC: 61 U/L
ALT SERPL W/O P-5'-P-CCNC: 26 U/L
AMPHET+METHAMPHET UR QL: NORMAL
ANION GAP SERPL CALC-SCNC: 10 MMOL/L
AST SERPL-CCNC: 39 U/L
BACTERIA #/AREA URNS HPF: NORMAL /HPF
BARBITURATES UR QL SCN>200 NG/ML: NEGATIVE
BASOPHILS # BLD AUTO: 0.04 K/UL
BASOPHILS NFR BLD: 0.3 %
BENZODIAZ UR QL SCN>200 NG/ML: NEGATIVE
BILIRUB SERPL-MCNC: 1.4 MG/DL
BILIRUB UR QL STRIP: NEGATIVE
BUN SERPL-MCNC: 11 MG/DL
BZE UR QL SCN: NEGATIVE
CALCIUM SERPL-MCNC: 9.4 MG/DL
CANNABINOIDS UR QL SCN: NORMAL
CHLORIDE SERPL-SCNC: 94 MMOL/L
CLARITY UR: CLEAR
CO2 SERPL-SCNC: 26 MMOL/L
COLOR UR: YELLOW
CREAT SERPL-MCNC: 0.9 MG/DL
CREAT UR-MCNC: 114.4 MG/DL
DIFFERENTIAL METHOD: ABNORMAL
EOSINOPHIL # BLD AUTO: 0 K/UL
EOSINOPHIL NFR BLD: 0.1 %
ERYTHROCYTE [DISTWIDTH] IN BLOOD BY AUTOMATED COUNT: 13.5 %
EST. GFR  (AFRICAN AMERICAN): >60 ML/MIN/1.73 M^2
EST. GFR  (NON AFRICAN AMERICAN): >60 ML/MIN/1.73 M^2
GLUCOSE SERPL-MCNC: 165 MG/DL
GLUCOSE UR QL STRIP: ABNORMAL
HCT VFR BLD AUTO: 38.5 %
HGB BLD-MCNC: 13.3 G/DL
HGB UR QL STRIP: NEGATIVE
HYALINE CASTS #/AREA URNS LPF: 0 /LPF
INR PPP: 1.1
KETONES UR QL STRIP: NEGATIVE
LACTATE SERPL-SCNC: 1.2 MMOL/L
LACTATE SERPL-SCNC: 2.6 MMOL/L
LEUKOCYTE ESTERASE UR QL STRIP: NEGATIVE
LYMPHOCYTES # BLD AUTO: 1.7 K/UL
LYMPHOCYTES NFR BLD: 10.7 %
MCH RBC QN AUTO: 29 PG
MCHC RBC AUTO-ENTMCNC: 34.5 G/DL
MCV RBC AUTO: 84 FL
METHADONE UR QL SCN>300 NG/ML: NEGATIVE
MICROSCOPIC COMMENT: NORMAL
MONOCYTES # BLD AUTO: 1.4 K/UL
MONOCYTES NFR BLD: 8.7 %
NEUTROPHILS # BLD AUTO: 12.6 K/UL
NEUTROPHILS NFR BLD: 80.5 %
NITRITE UR QL STRIP: NEGATIVE
OPIATES UR QL SCN: NORMAL
PCP UR QL SCN>25 NG/ML: NEGATIVE
PH UR STRIP: 7 [PH] (ref 5–8)
PLATELET # BLD AUTO: 247 K/UL
PMV BLD AUTO: 9.4 FL
POTASSIUM SERPL-SCNC: 3.7 MMOL/L
PROCALCITONIN SERPL IA-MCNC: 2.02 NG/ML
PROT SERPL-MCNC: 8.1 G/DL
PROT UR QL STRIP: ABNORMAL
PROTHROMBIN TIME: 11.1 SEC
RBC # BLD AUTO: 4.58 M/UL
RBC #/AREA URNS HPF: 1 /HPF (ref 0–4)
SODIUM SERPL-SCNC: 130 MMOL/L
SP GR UR STRIP: <=1.005 (ref 1–1.03)
TOXICOLOGY INFORMATION: NORMAL
URN SPEC COLLECT METH UR: ABNORMAL
UROBILINOGEN UR STRIP-ACNC: >=8 EU/DL
WBC # BLD AUTO: 15.71 K/UL
WBC #/AREA URNS HPF: 0 /HPF (ref 0–5)

## 2018-07-22 PROCEDURE — 25000003 PHARM REV CODE 250: Performed by: HOSPITALIST

## 2018-07-22 PROCEDURE — 0H9DXZZ DRAINAGE OF RIGHT LOWER ARM SKIN, EXTERNAL APPROACH: ICD-10-PCS | Performed by: ORTHOPAEDIC SURGERY

## 2018-07-22 PROCEDURE — 83605 ASSAY OF LACTIC ACID: CPT | Mod: 91

## 2018-07-22 PROCEDURE — 11000001 HC ACUTE MED/SURG PRIVATE ROOM

## 2018-07-22 PROCEDURE — 36000707: Performed by: ORTHOPAEDIC SURGERY

## 2018-07-22 PROCEDURE — 25000003 PHARM REV CODE 250: Performed by: INTERNAL MEDICINE

## 2018-07-22 PROCEDURE — 25000003 PHARM REV CODE 250: Performed by: ORTHOPAEDIC SURGERY

## 2018-07-22 PROCEDURE — 80053 COMPREHEN METABOLIC PANEL: CPT

## 2018-07-22 PROCEDURE — 96375 TX/PRO/DX INJ NEW DRUG ADDON: CPT

## 2018-07-22 PROCEDURE — 25000003 PHARM REV CODE 250: Performed by: NURSE ANESTHETIST, CERTIFIED REGISTERED

## 2018-07-22 PROCEDURE — 99285 EMERGENCY DEPT VISIT HI MDM: CPT | Mod: 25

## 2018-07-22 PROCEDURE — 96361 HYDRATE IV INFUSION ADD-ON: CPT

## 2018-07-22 PROCEDURE — 96365 THER/PROPH/DIAG IV INF INIT: CPT

## 2018-07-22 PROCEDURE — 71000033 HC RECOVERY, INTIAL HOUR: Performed by: ORTHOPAEDIC SURGERY

## 2018-07-22 PROCEDURE — 80307 DRUG TEST PRSMV CHEM ANLYZR: CPT

## 2018-07-22 PROCEDURE — 87070 CULTURE OTHR SPECIMN AEROBIC: CPT

## 2018-07-22 PROCEDURE — 85610 PROTHROMBIN TIME: CPT

## 2018-07-22 PROCEDURE — 87102 FUNGUS ISOLATION CULTURE: CPT

## 2018-07-22 PROCEDURE — 63600175 PHARM REV CODE 636 W HCPCS: Performed by: INTERNAL MEDICINE

## 2018-07-22 PROCEDURE — 81000 URINALYSIS NONAUTO W/SCOPE: CPT | Mod: 59

## 2018-07-22 PROCEDURE — 83605 ASSAY OF LACTIC ACID: CPT

## 2018-07-22 PROCEDURE — 37000009 HC ANESTHESIA EA ADD 15 MINS: Performed by: ORTHOPAEDIC SURGERY

## 2018-07-22 PROCEDURE — 87147 CULTURE TYPE IMMUNOLOGIC: CPT

## 2018-07-22 PROCEDURE — 36000706: Performed by: ORTHOPAEDIC SURGERY

## 2018-07-22 PROCEDURE — S4991 NICOTINE PATCH NONLEGEND: HCPCS | Performed by: HOSPITALIST

## 2018-07-22 PROCEDURE — 84145 PROCALCITONIN (PCT): CPT

## 2018-07-22 PROCEDURE — 36415 COLL VENOUS BLD VENIPUNCTURE: CPT

## 2018-07-22 PROCEDURE — 87075 CULTR BACTERIA EXCEPT BLOOD: CPT

## 2018-07-22 PROCEDURE — 87040 BLOOD CULTURE FOR BACTERIA: CPT | Mod: 59

## 2018-07-22 PROCEDURE — 37000008 HC ANESTHESIA 1ST 15 MINUTES: Performed by: ORTHOPAEDIC SURGERY

## 2018-07-22 PROCEDURE — 63600175 PHARM REV CODE 636 W HCPCS: Performed by: NURSE ANESTHETIST, CERTIFIED REGISTERED

## 2018-07-22 PROCEDURE — 87086 URINE CULTURE/COLONY COUNT: CPT

## 2018-07-22 PROCEDURE — 85025 COMPLETE CBC W/AUTO DIFF WBC: CPT

## 2018-07-22 RX ORDER — ACETAMINOPHEN 325 MG/1
650 TABLET ORAL EVERY 8 HOURS PRN
Status: DISCONTINUED | OUTPATIENT
Start: 2018-07-22 | End: 2018-07-24 | Stop reason: HOSPADM

## 2018-07-22 RX ORDER — SODIUM CHLORIDE, SODIUM LACTATE, POTASSIUM CHLORIDE, CALCIUM CHLORIDE 600; 310; 30; 20 MG/100ML; MG/100ML; MG/100ML; MG/100ML
INJECTION, SOLUTION INTRAVENOUS CONTINUOUS PRN
Status: DISCONTINUED | OUTPATIENT
Start: 2018-07-22 | End: 2018-07-22

## 2018-07-22 RX ORDER — BACITRACIN 50000 [IU]/1
INJECTION, POWDER, FOR SOLUTION INTRAMUSCULAR
Status: DISCONTINUED | OUTPATIENT
Start: 2018-07-22 | End: 2018-07-22 | Stop reason: HOSPADM

## 2018-07-22 RX ORDER — ONDANSETRON 2 MG/ML
4 INJECTION INTRAMUSCULAR; INTRAVENOUS
Status: COMPLETED | OUTPATIENT
Start: 2018-07-22 | End: 2018-07-22

## 2018-07-22 RX ORDER — LIDOCAINE HYDROCHLORIDE 10 MG/ML
INJECTION INFILTRATION; PERINEURAL
Status: DISCONTINUED | OUTPATIENT
Start: 2018-07-22 | End: 2018-07-22

## 2018-07-22 RX ORDER — MORPHINE SULFATE 2 MG/ML
2 INJECTION, SOLUTION INTRAMUSCULAR; INTRAVENOUS EVERY 4 HOURS PRN
Status: DISCONTINUED | OUTPATIENT
Start: 2018-07-22 | End: 2018-07-24 | Stop reason: HOSPADM

## 2018-07-22 RX ORDER — OXYCODONE AND ACETAMINOPHEN 10; 325 MG/1; MG/1
1 TABLET ORAL EVERY 4 HOURS PRN
Status: DISCONTINUED | OUTPATIENT
Start: 2018-07-22 | End: 2018-07-24 | Stop reason: HOSPADM

## 2018-07-22 RX ORDER — VANCOMYCIN HYDROCHLORIDE
1500
Status: DISCONTINUED | OUTPATIENT
Start: 2018-07-23 | End: 2018-07-22

## 2018-07-22 RX ORDER — MORPHINE SULFATE 4 MG/ML
2 INJECTION, SOLUTION INTRAMUSCULAR; INTRAVENOUS EVERY 5 MIN PRN
Status: DISCONTINUED | OUTPATIENT
Start: 2018-07-22 | End: 2018-07-22 | Stop reason: HOSPADM

## 2018-07-22 RX ORDER — VANCOMYCIN HYDROCHLORIDE
1500
Status: DISCONTINUED | OUTPATIENT
Start: 2018-07-22 | End: 2018-07-22 | Stop reason: SDUPTHER

## 2018-07-22 RX ORDER — MIDAZOLAM HYDROCHLORIDE 1 MG/ML
INJECTION, SOLUTION INTRAMUSCULAR; INTRAVENOUS
Status: DISCONTINUED | OUTPATIENT
Start: 2018-07-22 | End: 2018-07-22

## 2018-07-22 RX ORDER — RAMELTEON 8 MG/1
8 TABLET ORAL NIGHTLY PRN
Status: DISCONTINUED | OUTPATIENT
Start: 2018-07-22 | End: 2018-07-24 | Stop reason: HOSPADM

## 2018-07-22 RX ORDER — HYDROCODONE BITARTRATE AND ACETAMINOPHEN 10; 325 MG/1; MG/1
1 TABLET ORAL EVERY 4 HOURS PRN
Status: DISCONTINUED | OUTPATIENT
Start: 2018-07-22 | End: 2018-07-24 | Stop reason: HOSPADM

## 2018-07-22 RX ORDER — ONDANSETRON 2 MG/ML
4 INJECTION INTRAMUSCULAR; INTRAVENOUS EVERY 6 HOURS PRN
Status: DISCONTINUED | OUTPATIENT
Start: 2018-07-22 | End: 2018-07-24 | Stop reason: HOSPADM

## 2018-07-22 RX ORDER — SODIUM CHLORIDE, SODIUM LACTATE, POTASSIUM CHLORIDE, CALCIUM CHLORIDE 600; 310; 30; 20 MG/100ML; MG/100ML; MG/100ML; MG/100ML
INJECTION, SOLUTION INTRAVENOUS CONTINUOUS
Status: DISCONTINUED | OUTPATIENT
Start: 2018-07-22 | End: 2018-07-24 | Stop reason: HOSPADM

## 2018-07-22 RX ORDER — FENTANYL CITRATE 50 UG/ML
INJECTION, SOLUTION INTRAMUSCULAR; INTRAVENOUS
Status: DISCONTINUED | OUTPATIENT
Start: 2018-07-22 | End: 2018-07-22

## 2018-07-22 RX ORDER — ONDANSETRON 8 MG/1
8 TABLET, ORALLY DISINTEGRATING ORAL EVERY 8 HOURS PRN
Status: DISCONTINUED | OUTPATIENT
Start: 2018-07-22 | End: 2018-07-24 | Stop reason: HOSPADM

## 2018-07-22 RX ORDER — DIPHENHYDRAMINE HCL 25 MG
25 CAPSULE ORAL EVERY 6 HOURS PRN
Status: DISCONTINUED | OUTPATIENT
Start: 2018-07-22 | End: 2018-07-24 | Stop reason: HOSPADM

## 2018-07-22 RX ORDER — IBUPROFEN 200 MG
1 TABLET ORAL DAILY
Status: DISCONTINUED | OUTPATIENT
Start: 2018-07-22 | End: 2018-07-24 | Stop reason: HOSPADM

## 2018-07-22 RX ORDER — HEPARIN SODIUM 5000 [USP'U]/ML
5000 INJECTION, SOLUTION INTRAVENOUS; SUBCUTANEOUS EVERY 8 HOURS
Status: DISCONTINUED | OUTPATIENT
Start: 2018-07-22 | End: 2018-07-24 | Stop reason: HOSPADM

## 2018-07-22 RX ORDER — SODIUM CHLORIDE 0.9 % (FLUSH) 0.9 %
3 SYRINGE (ML) INJECTION
Status: DISCONTINUED | OUTPATIENT
Start: 2018-07-22 | End: 2018-07-22 | Stop reason: HOSPADM

## 2018-07-22 RX ORDER — OXYCODONE HYDROCHLORIDE 5 MG/1
5 TABLET ORAL
Status: DISCONTINUED | OUTPATIENT
Start: 2018-07-22 | End: 2018-07-22 | Stop reason: HOSPADM

## 2018-07-22 RX ORDER — ONDANSETRON 2 MG/ML
4 INJECTION INTRAMUSCULAR; INTRAVENOUS DAILY PRN
Status: DISCONTINUED | OUTPATIENT
Start: 2018-07-22 | End: 2018-07-22 | Stop reason: HOSPADM

## 2018-07-22 RX ORDER — PROPOFOL 10 MG/ML
VIAL (ML) INTRAVENOUS
Status: DISCONTINUED | OUTPATIENT
Start: 2018-07-22 | End: 2018-07-22

## 2018-07-22 RX ORDER — HYDROMORPHONE HYDROCHLORIDE 1 MG/ML
0.5 INJECTION, SOLUTION INTRAMUSCULAR; INTRAVENOUS; SUBCUTANEOUS EVERY 4 HOURS PRN
Status: DISCONTINUED | OUTPATIENT
Start: 2018-07-22 | End: 2018-07-24 | Stop reason: HOSPADM

## 2018-07-22 RX ORDER — SUCCINYLCHOLINE CHLORIDE 20 MG/ML
INJECTION INTRAMUSCULAR; INTRAVENOUS
Status: DISCONTINUED | OUTPATIENT
Start: 2018-07-22 | End: 2018-07-22

## 2018-07-22 RX ORDER — HYDROMORPHONE HYDROCHLORIDE 2 MG/ML
1 INJECTION, SOLUTION INTRAMUSCULAR; INTRAVENOUS; SUBCUTANEOUS
Status: COMPLETED | OUTPATIENT
Start: 2018-07-22 | End: 2018-07-22

## 2018-07-22 RX ADMIN — SODIUM CHLORIDE, SODIUM LACTATE, POTASSIUM CHLORIDE, AND CALCIUM CHLORIDE: 600; 310; 30; 20 INJECTION, SOLUTION INTRAVENOUS at 06:07

## 2018-07-22 RX ADMIN — NICOTINE 1 PATCH: 21 PATCH, EXTENDED RELEASE TRANSDERMAL at 08:07

## 2018-07-22 RX ADMIN — CEFTAZIDIME 2 G: 2 INJECTION, POWDER, FOR SOLUTION INTRAVENOUS at 04:07

## 2018-07-22 RX ADMIN — VANCOMYCIN HYDROCHLORIDE 1500 MG: 10 INJECTION, POWDER, LYOPHILIZED, FOR SOLUTION INTRAVENOUS at 05:07

## 2018-07-22 RX ADMIN — HYDROMORPHONE HYDROCHLORIDE 1 MG: 2 INJECTION, SOLUTION INTRAMUSCULAR; INTRAVENOUS; SUBCUTANEOUS at 05:07

## 2018-07-22 RX ADMIN — LIDOCAINE HYDROCHLORIDE 40 MG: 10 INJECTION, SOLUTION INFILTRATION; PERINEURAL at 06:07

## 2018-07-22 RX ADMIN — MIDAZOLAM 2 MG: 1 INJECTION INTRAMUSCULAR; INTRAVENOUS at 06:07

## 2018-07-22 RX ADMIN — ONDANSETRON 4 MG: 2 INJECTION, SOLUTION INTRAMUSCULAR; INTRAVENOUS at 05:07

## 2018-07-22 RX ADMIN — SUCCINYLCHOLINE CHLORIDE 140 MG: 20 INJECTION, SOLUTION INTRAMUSCULAR; INTRAVENOUS at 06:07

## 2018-07-22 RX ADMIN — HEPARIN SODIUM 5000 UNITS: 5000 INJECTION, SOLUTION INTRAVENOUS; SUBCUTANEOUS at 10:07

## 2018-07-22 RX ADMIN — PROPOFOL 200 MG: 10 INJECTION, EMULSION INTRAVENOUS at 06:07

## 2018-07-22 RX ADMIN — SODIUM CHLORIDE 2040 ML: 0.9 INJECTION, SOLUTION INTRAVENOUS at 04:07

## 2018-07-22 RX ADMIN — SODIUM CHLORIDE, SODIUM LACTATE, POTASSIUM CHLORIDE, AND CALCIUM CHLORIDE: .6; .31; .03; .02 INJECTION, SOLUTION INTRAVENOUS at 08:07

## 2018-07-22 RX ADMIN — FENTANYL CITRATE 100 MCG: 50 INJECTION, SOLUTION INTRAMUSCULAR; INTRAVENOUS at 06:07

## 2018-07-22 NOTE — HOSPITAL COURSE
TO OR for Irrigation and Debridement of Right wrist, [facility] medicine to assist with medical management and IV antibiotics     S/p I & D Right wrist PoD#1

## 2018-07-22 NOTE — HPI
Patient present to ER after two day history of increase Right wrist drainage. Patient had undergone an I & D to Right wrist by DR. Winn on 7/19/18. Patient left AMA. He denies any fever. Returned to ER because of Significant pain. He says the pain causes him to be out of his mind. He denies any fever. Unable to move his wrist, thumb, or fingers on his Right UE. No pain or problems with his Right elbow. Denies any other extremity or joint pain or swelling.     7/23 Patient doing better this am. Nurse states he had limited meds last night.

## 2018-07-22 NOTE — ED PROVIDER NOTES
SCRIBE #1 NOTE: INathalie, am scribing for, and in the presence of, Lupe Meyer MD. I have scribed the entire note.      History      Chief Complaint   Patient presents with    Abscess     R hand, left AMA on Friday       Review of patient's allergies indicates:  No Known Allergies     HPI   HPI    7/22/2018, 3:38 PM   History obtained from the patient      History of Present Illness: Dominic De Jesus Jr. is a 42 y.o. male patient who presents to the Emergency Department for absess to R wrist joint which onset gradually 3 days ago. Pt's R wrist abscess w/ pus and drainage, and is secondary to IV drug use. Pt was seen on 7/19/2018 for the same sx and was brought to the OR for surgery by Dr. Brandee Winn. Pt had an MRI done, but signed out AMA w/o further treatment following the I & D. Sxs are constant and moderate in severity. There are no mitigating or exacerbating factors noted. Pt denies any fever, chills, diaphoresis, fatigue, N/V/D, abdominal pain, myalgias, headache, numbness, and all other sxs at this time. No prior tx included. No further complaints or concerns at this time.         Arrival mode: Personal vehicle     PCP: Primary Doctor No       Past Medical History:  History reviewed. No pertinent past medical history.    Past Surgical History:  History reviewed. No pertinent surgical history.      Family History:  Family History   Problem Relation Age of Onset    Cancer Neg Hx        Social History:  Social History     Social History Main Topics    Smoking status: Current Every Day Smoker     Packs/day: 0.50     Types: Cigarettes    Smokeless tobacco: Unknown    Alcohol use No    Drug use: Yes     Types: IV      Comment: heroin    Sexual activity: Unknown       ROS   Review of Systems   Constitutional: Negative for chills, diaphoresis, fatigue and fever.   HENT: Negative for congestion and sore throat.    Respiratory: Negative for shortness of breath.    Cardiovascular: Negative  "for chest pain.   Gastrointestinal: Negative for abdominal pain, diarrhea, nausea and vomiting.   Genitourinary: Negative for dysuria.   Musculoskeletal: Negative for back pain and myalgias.   Skin: Negative for rash.        (+) R wrist abscess w/ pus & drainage   Neurological: Negative for weakness, numbness and headaches.   Hematological: Does not bruise/bleed easily.       Physical Exam      Initial Vitals [07/22/18 1519]   BP Pulse Resp Temp SpO2   118/71 (!) 115 16 (!) 101 °F (38.3 °C) (!) 94 %      MAP       --          Physical Exam  Nursing Notes and Vital Signs Reviewed.  Constitutional: Patient is in no acute distress. Well-developed and well-nourished.  Head: Atraumatic. Normocephalic.  Eyes: PERRL. EOM intact. Conjunctivae are not pale. No scleral icterus.  ENT: Mucous membranes are moist. Oropharynx is clear and symmetric.    Neck: Supple. Full ROM. No lymphadenopathy.  Cardiovascular: Regular rate. Regular rhythm. No murmurs, rubs, or gallops.  Pulmonary/Chest: No respiratory distress. Clear to auscultation bilaterally. No wheezing or rales.  Abdominal: Soft and non-distended.  There is no tenderness.  No rebound, guarding, or rigidity.   Musculoskeletal: Moves all extremities. No obvious deformities.   Skin: Warm and dry. Abscess to R wrist joint; swelling and extensive cellulitis to R hand.  Neurological:  Alert, awake, and appropriate.  Normal speech.  No acute focal neurological deficits are appreciated.  Psychiatric: Normal affect. Good eye contact. Appropriate in content.        ED Course    Procedures  ED Vital Signs:  Vitals:    07/22/18 1519 07/22/18 1621 07/22/18 1702 07/22/18 1802   BP: 118/71 123/80 125/83 122/83   Pulse: (!) 115 81 83 85   Resp: 16 15 14 (!) 22   Temp: (!) 101 °F (38.3 °C)      TempSrc:       SpO2: (!) 94% 99% 99% 99%   Weight: 68 kg (150 lb)      Height: 5' 8" (1.727 m)       07/22/18 1930 07/22/18 1935 07/22/18 1940 07/22/18 1945   BP: 108/72 108/72 118/69 112/71 " "  Pulse: 82 81 80 82   Resp: 18 15 12 12   Temp: 98.6 °F (37 °C)      TempSrc: Tympanic      SpO2: 100% 100% 100% 96%   Weight:       Height:        07/22/18 1949 07/22/18 1955 07/22/18 2001 07/22/18 2007   BP: 117/79 112/71 112/71    Pulse: 73 75 77    Resp: 18 18 18    Temp: 98.5 °F (36.9 °C) 98.5 °F (36.9 °C) 98 °F (36.7 °C)    TempSrc: Tympanic Oral Oral    SpO2: 95% 99% 99%    Weight:    68.2 kg (150 lb 6.4 oz)   Height:    5' 6" (1.676 m)    07/23/18 0054 07/23/18 0334 07/23/18 0731   BP: 123/75 101/64 123/81   Pulse: 85 80 77   Resp: 18 18 16   Temp: 98.4 °F (36.9 °C) 98.2 °F (36.8 °C) 98.8 °F (37.1 °C)   TempSrc: Oral Oral Oral   SpO2: 97% 98% 96%   Weight:      Height:          Abnormal Lab Results:  Labs Reviewed   CBC W/ AUTO DIFFERENTIAL - Abnormal; Notable for the following:        Result Value    WBC 15.71 (*)     RBC 4.58 (*)     Hemoglobin 13.3 (*)     Hematocrit 38.5 (*)     Gran # (ANC) 12.6 (*)     Mono # 1.4 (*)     Gran% 80.5 (*)     Lymph% 10.7 (*)     All other components within normal limits   COMPREHENSIVE METABOLIC PANEL - Abnormal; Notable for the following:     Sodium 130 (*)     Chloride 94 (*)     Glucose 165 (*)     Albumin 3.0 (*)     Total Bilirubin 1.4 (*)     All other components within normal limits   PROCALCITONIN - Abnormal; Notable for the following:     Procalcitonin 2.02 (*)     All other components within normal limits   LACTIC ACID, PLASMA - Abnormal; Notable for the following:     Lactate (Lactic Acid) 2.6 (*)     All other components within normal limits   URINALYSIS - Abnormal; Notable for the following:     Specific Gravity, UA <=1.005 (*)     Protein, UA 1+ (*)     Glucose, UA Trace (*)     Urobilinogen, UA >=8.0 (*)     All other components within normal limits   CULTURE, BODY FLUID - BACTEC   PROTIME-INR   DRUG SCREEN PANEL, URINE EMERGENCY   URINALYSIS MICROSCOPIC        All Lab Results:  Results for orders placed or performed during the hospital encounter of " 07/22/18   Blood culture x two cultures. Draw prior to antibiotics.   Result Value Ref Range    Blood Culture, Routine No Growth to date    Blood culture x two cultures. Draw prior to antibiotics.   Result Value Ref Range    Blood Culture, Routine No Growth to date    CBC auto differential   Result Value Ref Range    WBC 15.71 (H) 3.90 - 12.70 K/uL    RBC 4.58 (L) 4.60 - 6.20 M/uL    Hemoglobin 13.3 (L) 14.0 - 18.0 g/dL    Hematocrit 38.5 (L) 40.0 - 54.0 %    MCV 84 82 - 98 fL    MCH 29.0 27.0 - 31.0 pg    MCHC 34.5 32.0 - 36.0 g/dL    RDW 13.5 11.5 - 14.5 %    Platelets 247 150 - 350 K/uL    MPV 9.4 9.2 - 12.9 fL    Gran # (ANC) 12.6 (H) 1.8 - 7.7 K/uL    Lymph # 1.7 1.0 - 4.8 K/uL    Mono # 1.4 (H) 0.3 - 1.0 K/uL    Eos # 0.0 0.0 - 0.5 K/uL    Baso # 0.04 0.00 - 0.20 K/uL    Gran% 80.5 (H) 38.0 - 73.0 %    Lymph% 10.7 (L) 18.0 - 48.0 %    Mono% 8.7 4.0 - 15.0 %    Eosinophil% 0.1 0.0 - 8.0 %    Basophil% 0.3 0.0 - 1.9 %    Differential Method Automated    Comprehensive metabolic panel   Result Value Ref Range    Sodium 130 (L) 136 - 145 mmol/L    Potassium 3.7 3.5 - 5.1 mmol/L    Chloride 94 (L) 95 - 110 mmol/L    CO2 26 23 - 29 mmol/L    Glucose 165 (H) 70 - 110 mg/dL    BUN, Bld 11 6 - 20 mg/dL    Creatinine 0.9 0.5 - 1.4 mg/dL    Calcium 9.4 8.7 - 10.5 mg/dL    Total Protein 8.1 6.0 - 8.4 g/dL    Albumin 3.0 (L) 3.5 - 5.2 g/dL    Total Bilirubin 1.4 (H) 0.1 - 1.0 mg/dL    Alkaline Phosphatase 61 55 - 135 U/L    AST 39 10 - 40 U/L    ALT 26 10 - 44 U/L    Anion Gap 10 8 - 16 mmol/L    eGFR if African American >60 >60 mL/min/1.73 m^2    eGFR if non African American >60 >60 mL/min/1.73 m^2   Procalcitonin   Result Value Ref Range    Procalcitonin 2.02 (H) <0.25 ng/mL   Lactic acid, plasma #1   Result Value Ref Range    Lactate (Lactic Acid) 2.6 (H) 0.5 - 2.2 mmol/L   Urinalysis   Result Value Ref Range    Specimen UA Urine, Clean Catch     Color, UA Yellow Yellow, Straw, Rachana    Appearance, UA Clear Clear    pH,  UA 7.0 5.0 - 8.0    Specific Gravity, UA <=1.005 (A) 1.005 - 1.030    Protein, UA 1+ (A) Negative    Glucose, UA Trace (A) Negative    Ketones, UA Negative Negative    Bilirubin (UA) Negative Negative    Occult Blood UA Negative Negative    Nitrite, UA Negative Negative    Urobilinogen, UA >=8.0 (A) <2.0 EU/dL    Leukocytes, UA Negative Negative   Protime-INR   Result Value Ref Range    Prothrombin Time 11.1 9.0 - 12.5 sec    INR 1.1 0.8 - 1.2   Drug screen panel, emergency   Result Value Ref Range    Benzodiazepines Negative     Methadone metabolites Negative     Cocaine (Metab.) Negative     Opiate Scrn, Ur Presumptive Positive     Barbiturate Screen, Ur Negative     Amphetamine Screen, Ur Presumptive Positive     THC Presumptive Positive     Phencyclidine Negative     Creatinine, Random Ur 114.4 23.0 - 375.0 mg/dL    Toxicology Information SEE COMMENT    Lactic acid, plasma #2   Result Value Ref Range    Lactate (Lactic Acid) 1.2 0.5 - 2.2 mmol/L   Urinalysis Microscopic   Result Value Ref Range    RBC, UA 1 0 - 4 /hpf    WBC, UA 0 0 - 5 /hpf    Bacteria, UA None None-Occ /hpf    Hyaline Casts, UA 0 0-1/lpf /lpf    Microscopic Comment SEE COMMENT    CBC auto differential   Result Value Ref Range    WBC 14.27 (H) 3.90 - 12.70 K/uL    RBC 3.95 (L) 4.60 - 6.20 M/uL    Hemoglobin 11.4 (L) 14.0 - 18.0 g/dL    Hematocrit 33.8 (L) 40.0 - 54.0 %    MCV 86 82 - 98 fL    MCH 28.9 27.0 - 31.0 pg    MCHC 33.7 32.0 - 36.0 g/dL    RDW 13.7 11.5 - 14.5 %    Platelets 235 150 - 350 K/uL    MPV 9.2 9.2 - 12.9 fL    Gran # (ANC) 11.2 (H) 1.8 - 7.7 K/uL    Lymph # 1.8 1.0 - 4.8 K/uL    Mono # 1.2 (H) 0.3 - 1.0 K/uL    Eos # 0.0 0.0 - 0.5 K/uL    Baso # 0.01 0.00 - 0.20 K/uL    Gran% 79.0 (H) 38.0 - 73.0 %    Lymph% 12.8 (L) 18.0 - 48.0 %    Mono% 8.2 4.0 - 15.0 %    Eosinophil% 0.2 0.0 - 8.0 %    Basophil% 0.1 0.0 - 1.9 %    Poik Slight     Ovalocytes Occasional     Tear Drop Cells Occasional     Differential Method Automated     Basic metabolic panel   Result Value Ref Range    Sodium 138 136 - 145 mmol/L    Potassium 3.9 3.5 - 5.1 mmol/L    Chloride 106 95 - 110 mmol/L    CO2 24 23 - 29 mmol/L    Glucose 114 (H) 70 - 110 mg/dL    BUN, Bld 9 6 - 20 mg/dL    Creatinine 0.7 0.5 - 1.4 mg/dL    Calcium 8.5 (L) 8.7 - 10.5 mg/dL    Anion Gap 8 8 - 16 mmol/L    eGFR if African American >60 >60 mL/min/1.73 m^2    eGFR if non African American >60 >60 mL/min/1.73 m^2     Imaging Results:  Imaging Results          X-Ray Chest AP Portable (Final result)  Result time 07/22/18 16:42:34    Final result by Nabil Khan MD (07/22/18 16:42:34)                 Impression:      No acute findings.      Electronically signed by: Nabil Khan MD  Date:    07/22/2018  Time:    16:42             Narrative:    EXAMINATION:  XR CHEST AP PORTABLE    CLINICAL HISTORY:  Sepsis;    COMPARISON:  07/19/2018    FINDINGS:  Normal heart size. No definite infiltrates seen.  Markings in the left retrocardiac region are slightly prominent, likely vascular markings accentuated by relatively shallow lung volumes compared to prior study.                                    The Emergency Provider reviewed the vital signs and test results, which are outlined above.    ED Discussion     4:13 PM: Discussed case with Sharmin Gonzales NP ([facility]), who recommends discussing pt's case w/ bone & joint.     4:30 PM: Dr. Meyer discussed the pt's case with GUILLERMO Rosario ([phone]) who states she will discuss pt's case w/ the attending doctor on call for bone & joint, Dr. Tripp.    4:46 PM: GUILLERMO Rosario states that she will take pt to the OR.    5:52 PM: Discussed case with GUILLERMO Au ([facility]), who agrees with current care and management of pt and accepts admission.   Admitting Service: Hospital medicine   Consulting Physician: Dr. Ferraro  Admitting Physician: Dr. Tripp    6:05 PM: Re-evaluated pt. I have discussed test  results, shared treatment plan, and the need for admission with patient at bedside. Pt expresses understanding at this time and agree with all information. All questions answered. Pt has no further questions or concerns at this time. Pt is ready for admit.      ED Medication(s):  Medications   lactated ringers infusion ( Intravenous Restarted 7/23/18 1006)   heparin (porcine) injection 5,000 Units (5,000 Units Subcutaneous Given 7/23/18 0553)   HYDROmorphone injection 0.5 mg (not administered)   ondansetron disintegrating tablet 8 mg (not administered)   ramelteon tablet 8 mg (8 mg Oral Given 7/23/18 0243)   acetaminophen tablet 650 mg (not administered)   HYDROcodone-acetaminophen  mg per tablet 1 tablet (1 tablet Oral Given 7/23/18 0835)   morphine injection 2 mg (not administered)   oxyCODONE-acetaminophen  mg per tablet 1 tablet (1 tablet Oral Given 7/23/18 0243)   ondansetron injection 4 mg (not administered)   diphenhydrAMINE capsule 25 mg (25 mg Oral Given 7/23/18 0243)   nicotine 21 mg/24 hr 1 patch (1 patch Transdermal Patch Removed 7/23/18 0859)   vancomycin (VANCOCIN) 1,500 mg in dextrose 5 % 250 mL IVPB (1,500 mg Intravenous New Bag 7/23/18 0550)   sodium chloride 0.9% bolus 2,040 mL (0 mL/kg × 68 kg Intravenous Stopped 7/22/18 1736)   ceftazidime 2 g in dextrose 5% 50 mL IVPB (ready to mix system) (2 g Intravenous Given 7/22/18 1647)   vancomycin (VANCOCIN) 1,500 mg in dextrose 5 % 250 mL IVPB (0 mg Intravenous Stopped 7/22/18 1832)   hydromorphone (PF) injection 1 mg (1 mg Intravenous Given 7/22/18 1702)   ondansetron injection 4 mg (4 mg Intravenous Given 7/22/18 1701)       There are no discharge medications for this patient.            Medical Decision Making    Medical Decision Making:   Clinical Tests:   Lab Tests: Ordered and Reviewed  Radiological Study: Ordered and Reviewed           Scribe Attestation:   Scribe #1: I performed the above scribed service and the documentation  accurately describes the services I performed. I attest to the accuracy of the note.    Attending:   Physician Attestation Statement for Scribe #1: I, Lupe Meyer MD, personally performed the services described in this documentation, as scribed by Nathalie Noel, in my presence, and it is both accurate and complete.          Clinical Impression       ICD-10-CM ICD-9-CM   1. Abscess of bursa of right wrist M71.031 727.89   2. Sepsis A41.9 038.9     995.91       Disposition:   Disposition: Admitted  Condition: Fair         Lupe Meyer MD  07/23/18 1027       Lupe Meyer MD  07/23/18 1028

## 2018-07-22 NOTE — ED PROVIDER NOTES
Encounter Date: 7/22/2018       History     Chief Complaint   Patient presents with    Abscess     R hand, left AMA on Friday     HPI  Review of patient's allergies indicates:  No Known Allergies  No past medical history on file.  No past surgical history on file.  Family History   Problem Relation Age of Onset    Cancer Neg Hx      Social History   Substance Use Topics    Smoking status: Current Every Day Smoker     Packs/day: 0.50     Types: Cigarettes    Smokeless tobacco: Not on file    Alcohol use No     Review of Systems    Physical Exam     Initial Vitals [07/22/18 1519]   BP Pulse Resp Temp SpO2   118/71 (!) 115 16 (!) 101 °F (38.3 °C) (!) 94 %      MAP       --         Physical Exam        ED Course   Procedures  Labs Reviewed   CULTURE, BLOOD   CULTURE, BODY FLUID - BACTEC   CULTURE, BLOOD   CULTURE, BLOOD   CULTURE, URINE   CBC W/ AUTO DIFFERENTIAL   COMPREHENSIVE METABOLIC PANEL   LACTIC ACID, PLASMA   URINALYSIS   PROCALCITONIN   LACTIC ACID, PLASMA   URINALYSIS   PROTIME-INR          Imaging Results    None                               Clinical Impression:   {Add your Clinical Impression here. If you haven't documented one yet, please pend the note, finalize a Clinical Impression, and refresh your note before signing.:[id number]}

## 2018-07-22 NOTE — H&P
[facility]  Orthopedics  H&P    Patient Name: Dominic De Jesus Jr.  MRN: [id number]  Admission Date: 7/22/2018  Primary Care Provider: Primary Doctor No    Patient information was obtained from patient and ER records.     Subjective:     Principal Problem:<principal problem not specified>    Chief Complaint:   Chief Complaint   Patient presents with    Abscess     R hand, left AMA on Friday        HPI: Patient present to ER after two day history of increase Right wrist drainage. Patient had undergone an I & D to Right wrist by DR. Winn on 7/19/18. Patient left AMA. He denies any fever. Returned to ER because of Significant pain. He says the pain causes him to be out of his mind. He denies any fever. Unable to move his wrist, thumb, or fingers on his Right UE. No pain or problems with his Right elbow. Denies any other extremity or joint pain or swelling.     History reviewed. No pertinent past medical history. Patient history of IV drug use. Recent I & D of Right wrist with Dr. Winn. Patient left hospital AMA.     Surgical history- Right wrist I & D on 7/19/18    Review of patient's allergies indicates:  No Known Allergies    Current Facility-Administered Medications   Medication    ceftazidime 2 g in dextrose 5% 50 mL IVPB (ready to mix system)    vancomycin (VANCOCIN) 1,500 mg in dextrose 5 % 250 mL IVPB     No current outpatient prescriptions on file.     Family History     None        Social History Main Topics    Smoking status: Current Every Day Smoker     Packs/day: 0.50     Types: Cigarettes    Smokeless tobacco: Never Used    Alcohol use Yes    Drug use: Yes     Frequency: 3.0 times per week     Types: IV      Comment: heroin    Sexual activity: Not on file     Review of Systems   Constitution: Negative for fever.   Eyes: Positive for discharge and pain.   Musculoskeletal: Positive for joint pain and muscle weakness.   Gastrointestinal: Negative for diarrhea, nausea and  "vomiting.     Objective:     Vital Signs (Most Recent):  Temp: (!) 101 °F (38.3 °C) (07/22/18 1519)  Pulse: 83 (07/22/18 1702)  Resp: 14 (07/22/18 1702)  BP: 125/83 (07/22/18 1702)  SpO2: 99 % (07/22/18 1702) Vital Signs (24h Range):  Temp:  [101 °F (38.3 °C)] 101 °F (38.3 °C)  Pulse:  [] 83  Resp:  [14-16] 14  SpO2:  [94 %-99 %] 99 %  BP: (118-125)/(71-83) 125/83     Weight: 68 kg (150 lb)  Height: 5' 8" (172.7 cm)  Body mass index is 22.81 kg/m².      Intake/Output Summary (Last 24 hours) at 07/22/18 1738  Last data filed at 07/22/18 1736   Gross per 24 hour   Intake             2040 ml   Output                0 ml   Net             2040 ml       General    Vitals reviewed.  Constitutional: He is oriented to person, place, and time. He appears well-developed and well-nourished.   HENT:   Head: Normocephalic and atraumatic.   Cardiovascular: Intact distal pulses.    Pulmonary/Chest: Effort normal.   Abdominal: Soft.   Neurological: He is alert and oriented to person, place, and time.   Psychiatric: He has a normal mood and affect.             Right Hand/Wrist Exam     Range of Motion     Wrist   Extension: abnormal   Flexion: abnormal   Pronation: abnormal   Supination: abnormal     Other     Neuorologic Exam    Median Distribution: normal  Ulnar Distribution: normal  Radial Distribution: normal    Comments:  Significant swelling to Right wrist and hand  Incision along wrist with purulent drainage       Left Hand/Wrist Exam   Left hand exam is normal.    Range of Motion     Wrist   Extension: normal   Flexion: normal   Pronation: normal   Supination: normal           Muscle Strength   Right Upper Extremity   Wrist Extension: 0/5/5   Wrist Flexion: 0/5/5   : 0/5/5   Index Finger: 0/5  Middle Finger: 0/5  Ring Finger: 0/5  Little Finger: 0/5  Thumb - APB: 0/5  Thumb - FPL: 0/5  Pinch Mechanism: 0/5    Vascular Exam       Capillary Refill  Right Hand: normal capillary refill      Significant Labs: All " pertinent labs within the past 24 hours have been reviewed.    Significant Imaging: No Recent    Assessment/Plan:     Infection of right wrist    TO OR for I & D Right wrist   Consent for I & D Right wrist  Explained the importance for staying for antibiotics   Cultures to be done in OR             Maria A Ram PA-C  Orthopedics  [facility]

## 2018-07-22 NOTE — ED NOTES
Pt last had something to eat last night.  Pt last drank a frosty shortly before arrival here at the ED.  Physician was informed.

## 2018-07-22 NOTE — ASSESSMENT & PLAN NOTE
TO OR for I & D Right wrist   Consent for I & D Right wrist  Explained the importance for staying for antibiotics   Cultures to be done in OR

## 2018-07-22 NOTE — ANESTHESIA PREPROCEDURE EVALUATION
07/22/2018  Dominic De Jesus Jr. is a 42 y.o., male.    Anesthesia Evaluation    I have reviewed the Patient Summary Reports.        Review of Systems  Anesthesia Hx:  No problems with previous Anesthesia  History of prior surgery of interest to airway management or planning: Denies Family Hx of Anesthesia complications.   Denies Personal Hx of Anesthesia complications.   Cardiovascular:   Denies Hypertension.  Denies MI.  Denies CAD.       Pulmonary:   Denies Shortness of breath.    Hepatic/GI:   Hepatitis, C    Musculoskeletal:   Wrist infection   Neurological:   Denies Seizures.    Psych:   Opoid abuse         Physical Exam  General:  Well nourished    Airway/Jaw/Neck:  Airway Findings: Mouth Opening: Normal General Airway Assessment: Adult  Mallampati: I       Chest/Lungs:  Chest/Lungs Findings: Normal Respiratory Rate     Heart/Vascular:  Heart Findings: Rate: Normal             Anesthesia Plan  Type of Anesthesia, risks & benefits discussed:  Anesthesia Type:  general  Patient's Preference:   Intra-op Monitoring Plan: standard ASA monitors  Intra-op Monitoring Plan Comments:   Post Op Pain Control Plan:   Post Op Pain Control Plan Comments:   Induction:   IV  Beta Blocker:         Informed Consent: Patient understands risks and agrees with Anesthesia plan.  Questions answered. Anesthesia consent signed with patient.  ASA Score: 3  emergent   Day of Surgery Review of History & Physical:            Ready For Surgery From Anesthesia Perspective.

## 2018-07-22 NOTE — SUBJECTIVE & OBJECTIVE
"History reviewed. No pertinent past medical history. Patient history of IV drug use. Recent I & D of Right wrist with Dr. Winn. Patient left hospital [place].     Surgical history- Right wrist I & D on 7/19/18    Review of patient's allergies indicates:  No Known Allergies    Current Facility-Administered Medications   Medication    ceftazidime 2 g in dextrose 5% 50 mL IVPB (ready to mix system)    vancomycin (VANCOCIN) 1,500 mg in dextrose 5 % 250 mL IVPB     No current outpatient prescriptions on file.     Family History     None        Social History Main Topics    Smoking status: Current Every Day Smoker     Packs/day: 0.50     Types: Cigarettes    Smokeless tobacco: Never Used    Alcohol use Yes    Drug use: Yes     Frequency: 3.0 times per week     Types: IV      Comment: heroin    Sexual activity: Not on file     Review of Systems   Constitution: Negative for fever.   Eyes: Positive for discharge and pain.   Musculoskeletal: Positive for joint pain and muscle weakness.   Gastrointestinal: Negative for diarrhea, nausea and vomiting.     Objective:     Vital Signs (Most Recent):  Temp: (!) 101 °F (38.3 °C) (07/22/18 1519)  Pulse: 83 (07/22/18 1702)  Resp: 14 (07/22/18 1702)  BP: 125/83 (07/22/18 1702)  SpO2: 99 % (07/22/18 1702) Vital Signs (24h Range):  Temp:  [101 °F (38.3 °C)] 101 °F (38.3 °C)  Pulse:  [] 83  Resp:  [14-16] 14  SpO2:  [94 %-99 %] 99 %  BP: (118-125)/(71-83) 125/83     Weight: 68 kg (150 lb)  Height: 5' 8" (172.7 cm)  Body mass index is 22.81 kg/m².      Intake/Output Summary (Last 24 hours) at 07/22/18 1738  Last data filed at 07/22/18 1736   Gross per 24 hour   Intake             2040 ml   Output                0 ml   Net             2040 ml       General    Vitals reviewed.  Constitutional: He is oriented to person, place, and time. He appears well-developed and well-nourished.   HENT:   Head: Normocephalic and atraumatic.   Cardiovascular: Intact distal pulses.  "   Pulmonary/Chest: Effort normal.   Abdominal: Soft.   Neurological: He is alert and oriented to person, place, and time.   Psychiatric: He has a normal mood and affect.             Right Hand/Wrist Exam     Range of Motion     Wrist   Extension: abnormal   Flexion: abnormal   Pronation: abnormal   Supination: abnormal     Other     Neuorologic Exam    Median Distribution: normal  Ulnar Distribution: normal  Radial Distribution: normal    Comments:  Significant swelling to Right wrist and hand  Incision along wrist with purulent drainage       Left Hand/Wrist Exam   Left hand exam is normal.    Range of Motion     Wrist   Extension: normal   Flexion: normal   Pronation: normal   Supination: normal           Muscle Strength   Right Upper Extremity   Wrist Extension: 0/5/5   Wrist Flexion: 0/5/5   : 0/5/5   Index Finger: 0/5  Middle Finger: 0/5  Ring Finger: 0/5  Little Finger: 0/5  Thumb - APB: 0/5  Thumb - FPL: 0/5  Pinch Mechanism: 0/5    Vascular Exam       Capillary Refill  Right Hand: normal capillary refill      Significant Labs: All pertinent labs within the past 24 hours have been reviewed.    Significant Imaging: No Recent

## 2018-07-22 NOTE — PROCEDURES
"Dominic De Jesus Jr. Dictation #1  MRN:[id number]  CSN:[id number]  Dictation #2  MRN:[id number]  CSN:[id number]   is a 42 y.o. male patient.    Temp: (!) 101 °F (38.3 °C) (18)  Pulse: 85 (18)  Resp: (!) 22 (18)  BP: 122/83 (18)  SpO2: 99 % (18)  Weight: 68 kg (150 lb) (18)  Height: 5' 8" (172.7 cm) (18)       Incision and Drainage  Date/Time: 2018 6:47 PM  Location procedure was performed: [facility]  Performed by: QI BLANCHARD  Authorized by: QI BLANCHARD   Assisting provider: BARB FRY  Pre-operative diagnosis: abcess forearm/hand  Post-operative diagnosis: same  Consent Done: Yes  Consent: Written consent obtained.  Risks and benefits: risks, benefits and alternatives were discussed  Consent given by: patient  Patient understanding: patient states understanding of the procedure being performed  Patient consent: the patient's understanding of the procedure matches consent given  Procedure consent: procedure consent matches procedure scheduled  Relevant documents: relevant documents present and verified  Test results: test results available and properly labeled  Site marked: the operative site was marked  Imaging studies: imaging studies not available  Patient identity confirmed:  and name  Time out: Immediately prior to procedure a "time out" was called to verify the correct patient, procedure, equipment, support staff and site/side marked as required.  Type: abscess  Body area: upper extremity  Location details: right wrist  Anesthesia: see MAR for details  Patient sedated: no  Description of findings: open wound right dorsal wrist. wound extended proximal and distal. irrigated copsiously   Scalpel size: 10  Incision type: single straight  Complexity: complex  Drainage: pus,  serosanguinous,  purulent and  bloody  Drainage amount: moderate  Wound treatment: incision,  wound left open,  drainage and  " expression of material  Packing material: 1/4 in iodoform gauze  Technical procedures used: irrigation and debridement  Significant surgical tasks conducted by the assistant(s): irrigation of wound, packing of wound, dressing wound  Complications: No  Estimated blood loss (mL): 10  Specimens: Yes (cultures)  Implants: No  Patient tolerance: Patient tolerated the procedure well with no immediate complications          Navneet Tripp  7/22/2018

## 2018-07-23 PROBLEM — D64.9 NORMOCYTIC ANEMIA: Status: ACTIVE | Noted: 2018-07-23

## 2018-07-23 LAB
ANION GAP SERPL CALC-SCNC: 8 MMOL/L
BACTERIA SPEC AEROBE CULT: NO GROWTH
BACTERIA SPEC AEROBE CULT: NORMAL
BASOPHILS # BLD AUTO: 0.01 K/UL
BASOPHILS NFR BLD: 0.1 %
BUN SERPL-MCNC: 9 MG/DL
CALCIUM SERPL-MCNC: 8.5 MG/DL
CHLORIDE SERPL-SCNC: 106 MMOL/L
CO2 SERPL-SCNC: 24 MMOL/L
CREAT SERPL-MCNC: 0.7 MG/DL
DACRYOCYTES BLD QL SMEAR: ABNORMAL
DIFFERENTIAL METHOD: ABNORMAL
EOSINOPHIL # BLD AUTO: 0 K/UL
EOSINOPHIL NFR BLD: 0.2 %
ERYTHROCYTE [DISTWIDTH] IN BLOOD BY AUTOMATED COUNT: 13.7 %
EST. GFR  (AFRICAN AMERICAN): >60 ML/MIN/1.73 M^2
EST. GFR  (NON AFRICAN AMERICAN): >60 ML/MIN/1.73 M^2
GLUCOSE SERPL-MCNC: 114 MG/DL
HCT VFR BLD AUTO: 33.8 %
HGB BLD-MCNC: 11.4 G/DL
LYMPHOCYTES # BLD AUTO: 1.8 K/UL
LYMPHOCYTES NFR BLD: 12.8 %
MCH RBC QN AUTO: 28.9 PG
MCHC RBC AUTO-ENTMCNC: 33.7 G/DL
MCV RBC AUTO: 86 FL
MONOCYTES # BLD AUTO: 1.2 K/UL
MONOCYTES NFR BLD: 8.2 %
NEUTROPHILS # BLD AUTO: 11.2 K/UL
NEUTROPHILS NFR BLD: 79 %
OVALOCYTES BLD QL SMEAR: ABNORMAL
PLATELET # BLD AUTO: 235 K/UL
PMV BLD AUTO: 9.2 FL
POIKILOCYTOSIS BLD QL SMEAR: SLIGHT
POTASSIUM SERPL-SCNC: 3.9 MMOL/L
RBC # BLD AUTO: 3.95 M/UL
SODIUM SERPL-SCNC: 138 MMOL/L
WBC # BLD AUTO: 14.27 K/UL

## 2018-07-23 PROCEDURE — 11000001 HC ACUTE MED/SURG PRIVATE ROOM

## 2018-07-23 PROCEDURE — 25000003 PHARM REV CODE 250: Performed by: HOSPITALIST

## 2018-07-23 PROCEDURE — 63600175 PHARM REV CODE 636 W HCPCS: Performed by: INTERNAL MEDICINE

## 2018-07-23 PROCEDURE — 25000003 PHARM REV CODE 250: Performed by: EMERGENCY MEDICINE

## 2018-07-23 PROCEDURE — 25000003 PHARM REV CODE 250: Performed by: PHYSICIAN ASSISTANT

## 2018-07-23 PROCEDURE — 96372 THER/PROPH/DIAG INJ SC/IM: CPT

## 2018-07-23 PROCEDURE — 25000003 PHARM REV CODE 250: Performed by: INTERNAL MEDICINE

## 2018-07-23 PROCEDURE — 63600175 PHARM REV CODE 636 W HCPCS: Performed by: EMERGENCY MEDICINE

## 2018-07-23 PROCEDURE — S4991 NICOTINE PATCH NONLEGEND: HCPCS | Performed by: HOSPITALIST

## 2018-07-23 PROCEDURE — 85025 COMPLETE CBC W/AUTO DIFF WBC: CPT

## 2018-07-23 PROCEDURE — 36415 COLL VENOUS BLD VENIPUNCTURE: CPT

## 2018-07-23 PROCEDURE — 80048 BASIC METABOLIC PNL TOTAL CA: CPT

## 2018-07-23 RX ADMIN — HYDROCODONE BITARTRATE AND ACETAMINOPHEN 1 TABLET: 10; 325 TABLET ORAL at 08:07

## 2018-07-23 RX ADMIN — RAMELTEON 8 MG: 8 TABLET, FILM COATED ORAL at 10:07

## 2018-07-23 RX ADMIN — HEPARIN SODIUM 5000 UNITS: 5000 INJECTION, SOLUTION INTRAVENOUS; SUBCUTANEOUS at 05:07

## 2018-07-23 RX ADMIN — RAMELTEON 8 MG: 8 TABLET, FILM COATED ORAL at 02:07

## 2018-07-23 RX ADMIN — NICOTINE 1 PATCH: 21 PATCH, EXTENDED RELEASE TRANSDERMAL at 08:07

## 2018-07-23 RX ADMIN — VANCOMYCIN HYDROCHLORIDE 1500 MG: 10 INJECTION, POWDER, LYOPHILIZED, FOR SOLUTION INTRAVENOUS at 05:07

## 2018-07-23 RX ADMIN — HEPARIN SODIUM 5000 UNITS: 5000 INJECTION, SOLUTION INTRAVENOUS; SUBCUTANEOUS at 01:07

## 2018-07-23 RX ADMIN — DIPHENHYDRAMINE HYDROCHLORIDE 25 MG: 25 CAPSULE ORAL at 02:07

## 2018-07-23 RX ADMIN — HEPARIN SODIUM 5000 UNITS: 5000 INJECTION, SOLUTION INTRAVENOUS; SUBCUTANEOUS at 10:07

## 2018-07-23 RX ADMIN — HYDROCODONE BITARTRATE AND ACETAMINOPHEN 1 TABLET: 10; 325 TABLET ORAL at 05:07

## 2018-07-23 RX ADMIN — HYDROCODONE BITARTRATE AND ACETAMINOPHEN 1 TABLET: 10; 325 TABLET ORAL at 10:07

## 2018-07-23 RX ADMIN — OXYCODONE HYDROCHLORIDE AND ACETAMINOPHEN 1 TABLET: 10; 325 TABLET ORAL at 02:07

## 2018-07-23 RX ADMIN — SODIUM CHLORIDE, SODIUM LACTATE, POTASSIUM CHLORIDE, AND CALCIUM CHLORIDE: .6; .31; .03; .02 INJECTION, SOLUTION INTRAVENOUS at 03:07

## 2018-07-23 NOTE — PROGRESS NOTES
"[facility]  Orthopedics  Progress Note    Patient Name: Dominic De Jesus Jr.  MRN: [id number]  Admission Date: 7/22/2018  Hospital Length of Stay: 1 days  Attending Provider: Lillie Cameron MD  Primary Care Provider: Primary Doctor No  Follow-up For: Procedure(s) (LRB):  IRRIGATION AND DEBRIDEMENT (Right)    Post-Operative Day: 1 Day Post-Op  Subjective:     Principal Problem:Infection of right wrist    Principal Orthopedic Problem: Right wrist pain improved         Interval History: S/p I & D right wrist. Pain better controlled this am. Denies any other new chief complaint at this time.     Review of patient's allergies indicates:  No Known Allergies    Current Facility-Administered Medications   Medication    acetaminophen tablet 650 mg    diphenhydrAMINE capsule 25 mg    heparin (porcine) injection 5,000 Units    HYDROcodone-acetaminophen  mg per tablet 1 tablet    HYDROmorphone injection 0.5 mg    lactated ringers infusion    morphine injection 2 mg    nicotine 21 mg/24 hr 1 patch    ondansetron disintegrating tablet 8 mg    ondansetron injection 4 mg    oxyCODONE-acetaminophen  mg per tablet 1 tablet    ramelteon tablet 8 mg    vancomycin (VANCOCIN) 1,500 mg in dextrose 5 % 250 mL IVPB     Objective:     Vital Signs (Most Recent):  Temp: 98.2 °F (36.8 °C) (07/23/18 0334)  Pulse: 80 (07/23/18 0334)  Resp: 18 (07/23/18 0334)  BP: 101/64 (07/23/18 0334)  SpO2: 98 % (07/23/18 0334) Vital Signs (24h Range):  Temp:  [98 °F (36.7 °C)-101 °F (38.3 °C)] 98.2 °F (36.8 °C)  Pulse:  [] 80  Resp:  [12-22] 18  SpO2:  [94 %-100 %] 98 %  BP: (101-125)/(64-83) 101/64     Weight: 68.2 kg (150 lb 6.4 oz)  Height: 5' 6" (167.6 cm)  Body mass index is 24.28 kg/m².      Intake/Output Summary (Last 24 hours) at 07/23/18 0706  Last data filed at 07/23/18 0426   Gross per 24 hour   Intake          4723.33 ml   Output             1025 ml   Net          3698.33 ml "       General    Vitals reviewed.  Constitutional: He is oriented to person, place, and time. He appears well-developed and well-nourished.   Eyes: Pupils are equal, round, and reactive to light.   Cardiovascular: Intact distal pulses.    Pulmonary/Chest: Effort normal.   Abdominal: Soft.   Neurological: He is alert and oriented to person, place, and time.   Psychiatric: He has a normal mood and affect.         Right Elbow Exam     Comments:  Right wrist splint taken down to remove part of packing still with drainage  Swelling greatly improved  Has elevated on pillows  LTI Intact  ROm still limited               Significant Labs: All pertinent labs within the past 24 hours have been reviewed.    Significant Imaging: None    Assessment/Plan:     * Infection of right wrist    1. Continue IV antitbioics  2. Continue to elevate  3. Will remove more packing yang  4. Continue pain control   5. Splint and dressing replaced               Maria A Ram PA-C  Orthopedics  [facility]

## 2018-07-23 NOTE — HPI
"42M h/o R wrist abscess s/p I/D of abscess and removal of necrotic carpal bone on 7/19 and left AMA,  presents with increase in drainage of right wrist infection.  He reports he left AMA because "I was out of my mind".He denies any fever. Unable to move his wrist, thumb, or fingers on his Right UE. No pain or problems with his Right elbow. Denies any other extremity or joint pain or swelling.   In ER,  Temp 101F. .  Labs show lactate  Orthopedic surgery was called in ER and took patient for irrigation and debridement.  Hospital medicine consulted for medical management.      Patient doing well post op.  Denies fevers or pain at site.  No other complaints.   "

## 2018-07-23 NOTE — ASSESSMENT & PLAN NOTE
1. Continue IV antitbioics  2. Continue to elevate  3. Will remove more packing yang  4. Continue pain control   5. Splint and dressing replaced

## 2018-07-23 NOTE — ASSESSMENT & PLAN NOTE
- wbc 15, temp 101F, lactate 2.4  - NS 30ml/kg bolus given in ER    - repeat lactate  - continue fluids and abx

## 2018-07-23 NOTE — CONSULTS
Dominic De Jesus Jr. [id number] is a 42 y.o. male who has been consulted for vancomycin dosing.    Dx: Infection of wrist, IVDU  Goal trough: 15-20     The patient has the following labs:     Date Creatinine (mg/dl)    BUN WBC Count   7/22/2018 Estimated Creatinine Clearance: 100.7 mL/min (based on SCr of 0.9 mg/dL). Lab Results   Component Value Date    BUN 11 07/22/2018     Lab Results   Component Value Date    WBC 15.71 (H) 07/22/2018        Current weight is 68 kg (150 lb)    The patient will be started on vancomycin at a dose of 1,500 mg (22 mg/kg) every 12 hours. Patient will be followed by pharmacy for changes in renal function, toxicity, and efficacy.  The vancomycin trough has been ordered for 7/24 at 0430.      Thank you for allowing us to participate in this patient's care.     Gabi Llamas

## 2018-07-23 NOTE — SUBJECTIVE & OBJECTIVE
Interval History: The patient reports improvement in symptoms overnight. Ortho removed some packing today and plans for further removal tomorrow. The patient reports that his pain is adequately controlled. Encouraged elevation of his RUE. Continue IV ABX.       Review of Systems   Constitutional: Negative for activity change, appetite change, chills, diaphoresis, fatigue, fever and unexpected weight change.   HENT: Negative for congestion, facial swelling, rhinorrhea, sinus pressure, sneezing, sore throat, tinnitus and trouble swallowing.    Eyes: Negative for photophobia, discharge, redness and visual disturbance.   Respiratory: Negative for apnea, cough, chest tightness, shortness of breath, wheezing and stridor.    Cardiovascular: Negative for chest pain, palpitations and leg swelling.   Gastrointestinal: Negative for abdominal distention, abdominal pain, anal bleeding, blood in stool, constipation, diarrhea, nausea and vomiting.   Endocrine: Negative for polydipsia, polyphagia and polyuria.   Genitourinary: Negative for decreased urine volume, difficulty urinating, discharge, dysuria, flank pain, frequency and hematuria.   Musculoskeletal: Positive for arthralgias. Negative for back pain, gait problem, joint swelling, myalgias, neck pain and neck stiffness.   Skin: Positive for wound. Negative for color change, pallor and rash.        Wound to right wrist.    Allergic/Immunologic: Negative for immunocompromised state.   Neurological: Negative for dizziness, tremors, seizures, syncope, facial asymmetry, speech difficulty, weakness, light-headedness, numbness and headaches.   Psychiatric/Behavioral: Negative for behavioral problems, confusion, hallucinations and suicidal ideas. The patient is not nervous/anxious.    All other systems reviewed and are negative.    Objective:     Vital Signs (Most Recent):  Temp: 98.6 °F (37 °C) (07/23/18 1208)  Pulse: 70 (07/23/18 1208)  Resp: 18 (07/23/18 1208)  BP: 137/84  (07/23/18 1208)  SpO2: 100 % (07/23/18 1208) Vital Signs (24h Range):  Temp:  [98 °F (36.7 °C)-101 °F (38.3 °C)] 98.6 °F (37 °C)  Pulse:  [] 70  Resp:  [12-22] 18  SpO2:  [94 %-100 %] 100 %  BP: (101-137)/(64-84) 137/84     Weight: 68.2 kg (150 lb 6.4 oz)  Body mass index is 24.28 kg/m².    Intake/Output Summary (Last 24 hours) at 07/23/18 1407  Last data filed at 07/23/18 1306   Gross per 24 hour   Intake          6463.75 ml   Output             1025 ml   Net          5438.75 ml      Physical Exam   Constitutional: He is oriented to person, place, and time. He appears well-developed and well-nourished. No distress.   HENT:   Head: Normocephalic and atraumatic.   Mouth/Throat: Oropharynx is clear and moist.   Eyes: Conjunctivae are normal. Pupils are equal, round, and reactive to light. No scleral icterus.   Neck: Normal range of motion. Neck supple. No JVD present. No thyromegaly present.   Cardiovascular: Normal rate, regular rhythm, normal heart sounds and intact distal pulses.  Exam reveals no gallop and no friction rub.    No murmur heard.  Pulmonary/Chest: Effort normal and breath sounds normal. No respiratory distress. He has no wheezes. He has no rales.   Abdominal: Soft. Bowel sounds are normal. He exhibits no distension. There is no tenderness. There is no guarding.   Musculoskeletal: Normal range of motion. He exhibits no edema, tenderness or deformity.   R hand/forearm dressing c/d/i   Neurological: He is alert and oriented to person, place, and time. No cranial nerve deficit.   Skin: Skin is warm and dry. Capillary refill takes 2 to 3 seconds. No rash noted. He is not diaphoretic. No erythema.   Psychiatric: He has a normal mood and affect. His behavior is normal.   Nursing note and vitals reviewed.      Significant Labs: All pertinent labs within the past 24 hours have been reviewed.    Significant Imaging:   Imaging Results          X-Ray Chest AP Portable (Final result)  Result time 07/22/18  16:42:34    Final result by Nabil Khan MD (07/22/18 16:42:34)                 Impression:      No acute findings.      Electronically signed by: Nabil Khan MD  Date:    07/22/2018  Time:    16:42             Narrative:    EXAMINATION:  XR CHEST AP PORTABLE    CLINICAL HISTORY:  Sepsis;    COMPARISON:  07/19/2018    FINDINGS:  Normal heart size. No definite infiltrates seen.  Markings in the left retrocardiac region are slightly prominent, likely vascular markings accentuated by relatively shallow lung volumes compared to prior study.

## 2018-07-23 NOTE — ASSESSMENT & PLAN NOTE
- wbc 15, temp 101F, lactate 2.4  - NS 30ml/kg bolus given in ER  - repeat lactic 1.2  - continue fluids and abx

## 2018-07-23 NOTE — CONSULTS
"[facility] Hospital Medicine  Consult Note    Patient Name: Dominic De Jesus Jr.  MRN: [id number]  Admission Date: 7/22/2018  Hospital Length of Stay: 0 days  Attending Physician: Navneet Mcdonough MD   Primary Care Provider: Primary Doctor No           Patient information was obtained from patient and ER records.     Consults  Subjective:     Principal Problem: Infection of right wrist    Chief Complaint:   Chief Complaint   Patient presents with    Abscess     R hand, left AMA on Friday        HPI: 42M h/o R wrist abscess s/p I/D of abscess and removal of necrotic carpal bone on 7/19 and left AMA,  presents with increase in drainage of right wrist infection.  He reports he left AMA because "I was out of my mind".He denies any fever. Unable to move his wrist, thumb, or fingers on his Right UE. No pain or problems with his Right elbow. Denies any other extremity or joint pain or swelling.   In ER,  Temp 101F. .  Labs show lactate  Orthopedic surgery was called in ER and took patient for irrigation and debridement.  Hospital medicine consulted for medical management.      Patient doing well post op.  Denies fevers or pain at site.  No other complaints.     History reviewed. No pertinent past medical history.    History reviewed. No pertinent surgical history.    Review of patient's allergies indicates:  No Known Allergies    No current facility-administered medications on file prior to encounter.      Current Outpatient Prescriptions on File Prior to Encounter   Medication Sig    [DISCONTINUED] diclofenac (VOLTAREN) 50 MG EC tablet Take 1 tablet (50 mg total) by mouth 2 (two) times daily.     Family History     None        Social History Main Topics    Smoking status: Current Every Day Smoker     Packs/day: 0.50     Types: Cigarettes    Smokeless tobacco: Never Used    Alcohol use Yes    Drug use: Yes     Frequency: 3.0 times per week     Types: IV      Comment: heroin    Sexual " activity: Not on file     Review of Systems   Constitutional: Negative for activity change, appetite change, chills, diaphoresis, fatigue and fever.   HENT: Negative for facial swelling, sore throat, tinnitus and trouble swallowing.    Eyes: Negative for photophobia and visual disturbance.   Respiratory: Negative for apnea, cough, chest tightness, shortness of breath and wheezing.    Cardiovascular: Negative for chest pain, palpitations and leg swelling.   Gastrointestinal: Negative for abdominal distention, abdominal pain, constipation, diarrhea, nausea and vomiting.   Endocrine: Negative for polydipsia, polyphagia and polyuria.   Genitourinary: Negative for decreased urine volume, dysuria, flank pain, frequency and hematuria.   Musculoskeletal: Negative for arthralgias, back pain, joint swelling, myalgias and neck stiffness.   Skin: Negative for pallor and rash.   Allergic/Immunologic: Negative for immunocompromised state.   Neurological: Negative for dizziness, seizures, syncope, weakness, numbness and headaches.   Psychiatric/Behavioral: Negative for confusion, hallucinations and suicidal ideas. The patient is not nervous/anxious.    All other systems reviewed and are negative.    Objective:     Vital Signs (Most Recent):  Temp: 98 °F (36.7 °C) (07/22/18 2001)  Pulse: 77 (07/22/18 2001)  Resp: 18 (07/22/18 2001)  BP: 112/71 (07/22/18 2001)  SpO2: 99 % (07/22/18 2001) Vital Signs (24h Range):  Temp:  [98 °F (36.7 °C)-101 °F (38.3 °C)] 98 °F (36.7 °C)  Pulse:  [] 77  Resp:  [12-22] 18  SpO2:  [94 %-100 %] 99 %  BP: (108-125)/(69-83) 112/71     Weight: 68.2 kg (150 lb 6.4 oz)  Body mass index is 24.28 kg/m².    Physical Exam   Constitutional: He is oriented to person, place, and time. He appears well-developed and well-nourished. No distress.   HENT:   Head: Normocephalic and atraumatic.   Mouth/Throat: Oropharynx is clear and moist.   Eyes: Conjunctivae are normal. Pupils are equal, round, and reactive to  light. No scleral icterus.   Neck: No JVD present. No thyromegaly present.   Cardiovascular: Regular rhythm.  Exam reveals no gallop and no friction rub.    No murmur heard.  Pulmonary/Chest: Effort normal and breath sounds normal. No respiratory distress. He has no wheezes. He has no rales.   Abdominal: Soft. Bowel sounds are normal. He exhibits no distension. There is no tenderness. There is no guarding.   Musculoskeletal: Normal range of motion.   R hand/forearm dressing c/d/i   Neurological: He is alert and oriented to person, place, and time. No cranial nerve deficit.   Skin: Skin is warm. Capillary refill takes 2 to 3 seconds. He is not diaphoretic. No erythema.   Psychiatric: He has a normal mood and affect.   Nursing note and vitals reviewed.        CRANIAL NERVES     CN III, IV, VI   Pupils are equal, round, and reactive to light.       Significant Labs:   CBC:   Recent Labs  Lab 07/22/18  1600   WBC 15.71*   HGB 13.3*   HCT 38.5*        CMP:   Recent Labs  Lab 07/22/18  1600   *   K 3.7   CL 94*   CO2 26   *   BUN 11   CREATININE 0.9   CALCIUM 9.4   PROT 8.1   ALBUMIN 3.0*   BILITOT 1.4*   ALKPHOS 61   AST 39   ALT 26   ANIONGAP 10   EGFRNONAA >60     Lactic Acid:   Recent Labs  Lab 07/22/18  1600   LACTATE 2.6*     Urine Studies:   Recent Labs  Lab 07/22/18  1715   COLORU Yellow   APPEARANCEUA Clear   PHUR 7.0   SPECGRAV <=1.005*   PROTEINUA 1+*   GLUCUA Trace*   KETONESU Negative   BILIRUBINUA Negative   OCCULTUA Negative   NITRITE Negative   UROBILINOGEN >=8.0*   LEUKOCYTESUR Negative   RBCUA 1   WBCUA 0   BACTERIA None   HYALINECASTS 0     All pertinent labs within the past 24 hours have been reviewed.    Significant Imaging: I have reviewed all pertinent imaging results/findings within the past 24 hours.   Imaging Results          X-Ray Chest AP Portable (Final result)  Result time 07/22/18 16:42:34    Final result by Nabil Khan MD (07/22/18 16:42:34)                  Impression:      No acute findings.      Electronically signed by: Nabil Khan MD  Date:    07/22/2018  Time:    16:42             Narrative:    EXAMINATION:  XR CHEST AP PORTABLE    CLINICAL HISTORY:  Sepsis;    COMPARISON:  07/19/2018    FINDINGS:  Normal heart size. No definite infiltrates seen.  Markings in the left retrocardiac region are slightly prominent, likely vascular markings accentuated by relatively shallow lung volumes compared to prior study.                                  Assessment/Plan:     * Infection of right wrist    - s/p irrigation and debridement by Ortho    - continue vanc and IVF  - follow up wound cx           Sepsis    - wbc 15, temp 101F, lactate 2.4  - NS 30ml/kg bolus given in ER    - repeat lactate  - continue fluids and abx            VTE Risk Mitigation         Ordered     heparin (porcine) injection 5,000 Units  Every 8 hours      07/22/18 2005     IP VTE HIGH RISK PATIENT  Once      07/22/18 2005              Thank you for your consult. I will follow-up with patient. Please contact us if you have any additional questions.    Sigifredo Ferraro MD  Department of Hospital Medicine   [facility]

## 2018-07-23 NOTE — ANESTHESIA RELEASE NOTE
"Anesthesia Release from PACU Note    Patient: Dominic De Jesus     Procedure(s) Performed: Procedure(s) (LRB):  IRRIGATION AND DEBRIDEMENT (Right)    Anesthesia type: general    Post pain: Adequate analgesia    Post assessment: no apparent anesthetic complications    Last Vitals:   Visit Vitals  /72 (BP Location: Left arm, Patient Position: Lying)   Pulse 81   Temp 37 °C (98.6 °F) (Tympanic)   Resp 15   Ht 5' 8" (1.727 m)   Wt 68 kg (150 lb)   SpO2 100%   BMI 22.81 kg/m²       Post vital signs: stable    Level of consciousness: awake    Nausea/Vomiting: no nausea/no vomiting    Complications: none    Airway Patency: patent    Respiratory: unassisted, spontaneous ventilation, face mask    Cardiovascular: stable and blood pressure at baseline    Hydration: euvolemic  "

## 2018-07-23 NOTE — SUBJECTIVE & OBJECTIVE
"Principal Problem:Infection of right wrist    Principal Orthopedic Problem: Right wrist pain improved         Interval History: S/p I & D right wrist. Pain better controlled this am. Denies any other new chief complaint at this time.     Review of patient's allergies indicates:  No Known Allergies    Current Facility-Administered Medications   Medication    acetaminophen tablet 650 mg    diphenhydrAMINE capsule 25 mg    heparin (porcine) injection 5,000 Units    HYDROcodone-acetaminophen  mg per tablet 1 tablet    HYDROmorphone injection 0.5 mg    lactated ringers infusion    morphine injection 2 mg    nicotine 21 mg/24 hr 1 patch    ondansetron disintegrating tablet 8 mg    ondansetron injection 4 mg    oxyCODONE-acetaminophen  mg per tablet 1 tablet    ramelteon tablet 8 mg    vancomycin (VANCOCIN) 1,500 mg in dextrose 5 % 250 mL IVPB     Objective:     Vital Signs (Most Recent):  Temp: 98.2 °F (36.8 °C) (07/23/18 0334)  Pulse: 80 (07/23/18 0334)  Resp: 18 (07/23/18 0334)  BP: 101/64 (07/23/18 0334)  SpO2: 98 % (07/23/18 0334) Vital Signs (24h Range):  Temp:  [98 °F (36.7 °C)-101 °F (38.3 °C)] 98.2 °F (36.8 °C)  Pulse:  [] 80  Resp:  [12-22] 18  SpO2:  [94 %-100 %] 98 %  BP: (101-125)/(64-83) 101/64     Weight: 68.2 kg (150 lb 6.4 oz)  Height: 5' 6" (167.6 cm)  Body mass index is 24.28 kg/m².      Intake/Output Summary (Last 24 hours) at 07/23/18 0706  Last data filed at 07/23/18 0426   Gross per 24 hour   Intake          4723.33 ml   Output             1025 ml   Net          3698.33 ml       General    Vitals reviewed.  Constitutional: He is oriented to person, place, and time. He appears well-developed and well-nourished.   Eyes: Pupils are equal, round, and reactive to light.   Cardiovascular: Intact distal pulses.    Pulmonary/Chest: Effort normal.   Abdominal: Soft.   Neurological: He is alert and oriented to person, place, and time.   Psychiatric: He has a normal mood and " affect.         Right Elbow Exam     Comments:  Right wrist splint taken down to remove part of packing still with drainage  Swelling greatly improved  Has elevated on pillows  LTI Intact  ROm still limited               Significant Labs: All pertinent labs within the past 24 hours have been reviewed.    Significant Imaging: None

## 2018-07-23 NOTE — HOSPITAL COURSE
7/23/18 The patient reports improvement in symptoms overnight. Ortho removed some packing today and plans for further removal tomorrow. The patient reports that his pain is adequately controlled. Encouraged elevation of his RUE. Continue IV ABX. 7/24/18 Ortho changed packing this morning. Awaiting cultures.     Addendum 7/24/18 Notified by nursing staff that the patient wishes to leave AMA. Discussed with patient the risk associated with leaving AMA. The patient was provided with a prescription for a 10 day course of Augmentin. The patient was strongly encouraged to follow up with his PCP and with Orthopedic surgery.

## 2018-07-23 NOTE — TRANSFER OF CARE
"Anesthesia Transfer of Care Note    Patient: Dominic De Jesus Jr.    Procedure(s) Performed: Procedure(s) (LRB):  IRRIGATION AND DEBRIDEMENT (Right)    Patient location: PACU    Anesthesia Type: general    Transport from OR: Transported from OR on room air with adequate spontaneous ventilation    Post pain: adequate analgesia    Post assessment: no apparent anesthetic complications    Post vital signs: stable    Level of consciousness: awake    Nausea/Vomiting: no nausea/vomiting    Complications: none    Transfer of care protocol was followed      Last vitals:   Visit Vitals  /72 (BP Location: Left arm, Patient Position: Lying)   Pulse 81   Temp 37 °C (98.6 °F) (Tympanic)   Resp 15   Ht 5' 8" (1.727 m)   Wt 68 kg (150 lb)   SpO2 100%   BMI 22.81 kg/m²     "

## 2018-07-23 NOTE — PROGRESS NOTES
"[facility] Hospital Medicine  Progress Note    Patient Name: Dominic De Jesus Jr.  MRN: [id number]  Patient Class: IP- Inpatient   Admission Date: 7/22/2018  Length of Stay: 1 days  Attending Physician: Lillie Cameron MD  Primary Care Provider: Primary Doctor No        Subjective:     Principal Problem:Infection of right wrist    HPI:  42M h/o R wrist abscess s/p I/D of abscess and removal of necrotic carpal bone on 7/19 and left AMA,  presents with increase in drainage of right wrist infection.  He reports he left AMA because "I was out of my mind".He denies any fever. Unable to move his wrist, thumb, or fingers on his Right UE. No pain or problems with his Right elbow. Denies any other extremity or joint pain or swelling.   In ER,  Temp 101F. .  Labs show lactate  Orthopedic surgery was called in ER and took patient for irrigation and debridement.  Hospital medicine consulted for medical management.      Patient doing well post op.  Denies fevers or pain at site.  No other complaints.     Hospital Course:  7/23/18 The patient reports improvement in symptoms overnight. Ortho removed some packing today and plans for further removal tomorrow. The patient reports that his pain is adequately controlled. Encouraged elevation of his RUE. Continue IV ABX.     Interval History: The patient reports improvement in symptoms overnight. Ortho removed some packing today and plans for further removal tomorrow. The patient reports that his pain is adequately controlled. Encouraged elevation of his RUE. Continue IV ABX.       Review of Systems   Constitutional: Negative for activity change, appetite change, chills, diaphoresis, fatigue, fever and unexpected weight change.   HENT: Negative for congestion, facial swelling, rhinorrhea, sinus pressure, sneezing, sore throat, tinnitus and trouble swallowing.    Eyes: Negative for photophobia, discharge, redness and visual disturbance.   Respiratory: Negative " for apnea, cough, chest tightness, shortness of breath, wheezing and stridor.    Cardiovascular: Negative for chest pain, palpitations and leg swelling.   Gastrointestinal: Negative for abdominal distention, abdominal pain, anal bleeding, blood in stool, constipation, diarrhea, nausea and vomiting.   Endocrine: Negative for polydipsia, polyphagia and polyuria.   Genitourinary: Negative for decreased urine volume, difficulty urinating, discharge, dysuria, flank pain, frequency and hematuria.   Musculoskeletal: Positive for arthralgias. Negative for back pain, gait problem, joint swelling, myalgias, neck pain and neck stiffness.   Skin: Positive for wound. Negative for color change, pallor and rash.        Wound to right wrist.    Allergic/Immunologic: Negative for immunocompromised state.   Neurological: Negative for dizziness, tremors, seizures, syncope, facial asymmetry, speech difficulty, weakness, light-headedness, numbness and headaches.   Psychiatric/Behavioral: Negative for behavioral problems, confusion, hallucinations and suicidal ideas. The patient is not nervous/anxious.    All other systems reviewed and are negative.    Objective:     Vital Signs (Most Recent):  Temp: 98.6 °F (37 °C) (07/23/18 1208)  Pulse: 70 (07/23/18 1208)  Resp: 18 (07/23/18 1208)  BP: 137/84 (07/23/18 1208)  SpO2: 100 % (07/23/18 1208) Vital Signs (24h Range):  Temp:  [98 °F (36.7 °C)-101 °F (38.3 °C)] 98.6 °F (37 °C)  Pulse:  [] 70  Resp:  [12-22] 18  SpO2:  [94 %-100 %] 100 %  BP: (101-137)/(64-84) 137/84     Weight: 68.2 kg (150 lb 6.4 oz)  Body mass index is 24.28 kg/m².    Intake/Output Summary (Last 24 hours) at 07/23/18 1407  Last data filed at 07/23/18 1306   Gross per 24 hour   Intake          6463.75 ml   Output             1025 ml   Net          5438.75 ml      Physical Exam   Constitutional: He is oriented to person, place, and time. He appears well-developed and well-nourished. No distress.   HENT:   Head:  Normocephalic and atraumatic.   Mouth/Throat: Oropharynx is clear and moist.   Eyes: Conjunctivae are normal. Pupils are equal, round, and reactive to light. No scleral icterus.   Neck: Normal range of motion. Neck supple. No JVD present. No thyromegaly present.   Cardiovascular: Normal rate, regular rhythm, normal heart sounds and intact distal pulses.  Exam reveals no gallop and no friction rub.    No murmur heard.  Pulmonary/Chest: Effort normal and breath sounds normal. No respiratory distress. He has no wheezes. He has no rales.   Abdominal: Soft. Bowel sounds are normal. He exhibits no distension. There is no tenderness. There is no guarding.   Musculoskeletal: Normal range of motion. He exhibits no edema, tenderness or deformity.   R hand/forearm dressing c/d/i   Neurological: He is alert and oriented to person, place, and time. No cranial nerve deficit.   Skin: Skin is warm and dry. Capillary refill takes 2 to 3 seconds. No rash noted. He is not diaphoretic. No erythema.   Psychiatric: He has a normal mood and affect. His behavior is normal.   Nursing note and vitals reviewed.      Significant Labs: All pertinent labs within the past 24 hours have been reviewed.    Significant Imaging:   Imaging Results          X-Ray Chest AP Portable (Final result)  Result time 07/22/18 16:42:34    Final result by Nabil Khan MD (07/22/18 16:42:34)                 Impression:      No acute findings.      Electronically signed by: Nabil Khan MD  Date:    07/22/2018  Time:    16:42             Narrative:    EXAMINATION:  XR CHEST AP PORTABLE    CLINICAL HISTORY:  Sepsis;    COMPARISON:  07/19/2018    FINDINGS:  Normal heart size. No definite infiltrates seen.  Markings in the left retrocardiac region are slightly prominent, likely vascular markings accentuated by relatively shallow lung volumes compared to prior study.                                   Assessment/Plan:      * Infection of right wrist    - s/p  irrigation and debridement by Ortho  - continue vanc and IVF  - follow up wound cx   7/23/18  The patient reports improvement in symptoms overnight. Ortho removed some packing today and plans for further removal tomorrow. The patient reports that his pain is adequately controlled. Encouraged elevation of his RUE. Continue IV ABX.           Normocytic anemia    - Monitor and transfuse if symptomatic  - CBC in am           Sepsis    - wbc 15, temp 101F, lactate 2.4  - NS 30ml/kg bolus given in ER  - repeat lactic 1.2  - continue fluids and abx            VTE Risk Mitigation         Ordered     heparin (porcine) injection 5,000 Units  Every 8 hours      07/22/18 2005     IP VTE HIGH RISK PATIENT  Once      07/22/18 2005              Jai Layton NP  Department of Hospital Medicine   [facility]

## 2018-07-23 NOTE — PLAN OF CARE
Problem: Patient Care Overview  Goal: Plan of Care Review  Outcome: Ongoing (interventions implemented as appropriate)  Fall precautions maintained, pt free from injuries/fall.  Repositions and ambulates with stand by assist.  C/o pain on R hand, RUE elevated, sling available during ambulation, PRN pain med given.  Dressing clean, dry, and intact. Some swelling and tingling noted.  IVF and abx as prescribed.  POC and meds discussed with pt, pt verbalized understanding.  Side rails x 2, bed locked and low, phone and call light w/in reach.  Chart check done. Will cont to monitor.

## 2018-07-23 NOTE — ANESTHESIA POSTPROCEDURE EVALUATION
"Anesthesia Post Evaluation    Patient: Dominic De Jesus Jr.    Procedure(s) Performed: Procedure(s) (LRB):  IRRIGATION AND DEBRIDEMENT (Right)    Final Anesthesia Type: general  Patient location during evaluation: PACU  Patient participation: Yes- Able to Participate  Level of consciousness: awake  Post-procedure vital signs: reviewed and stable  Pain management: adequate  Airway patency: patent  PONV status at discharge: No PONV  Anesthetic complications: no      Cardiovascular status: hemodynamically stable  Respiratory status: unassisted, spontaneous ventilation and face mask  Hydration status: euvolemic  Follow-up not needed.        Visit Vitals  /72 (BP Location: Left arm, Patient Position: Lying)   Pulse 81   Temp 37 °C (98.6 °F) (Tympanic)   Resp 15   Ht 5' 8" (1.727 m)   Wt 68 kg (150 lb)   SpO2 100%   BMI 22.81 kg/m²       Pain/Nicole Score: No Data Recorded      "

## 2018-07-23 NOTE — ASSESSMENT & PLAN NOTE
- s/p irrigation and debridement by Ortho  - continue vanc and IVF  - follow up wound cx   7/23/18  The patient reports improvement in symptoms overnight. Ortho removed some packing today and plans for further removal tomorrow. The patient reports that his pain is adequately controlled. Encouraged elevation of his RUE. Continue IV ABX.

## 2018-07-23 NOTE — ANESTHESIA POSTPROCEDURE EVALUATION
"Anesthesia Post Evaluation    Patient: Dominic De Jesus Jr.    Procedure(s) Performed: Procedure(s) (LRB):  IRRIGATION AND DEBRIDEMENT (Right)    Final Anesthesia Type: general  Patient location during evaluation: PACU  Patient participation: Yes- Able to Participate  Level of consciousness: awake and alert  Post-procedure vital signs: reviewed and stable  Pain management: adequate  Airway patency: patent  PONV status at discharge: No PONV  Anesthetic complications: no      Cardiovascular status: blood pressure returned to baseline  Respiratory status: unassisted  Hydration status: euvolemic  Follow-up not needed.        Visit Vitals  /69 (BP Location: Left arm, Patient Position: Lying)   Pulse 80   Temp 37 °C (98.6 °F) (Tympanic)   Resp 12   Ht 5' 8" (1.727 m)   Wt 68 kg (150 lb)   SpO2 100%   BMI 22.81 kg/m²       Pain/Nicole Score: Pain Assessment Performed: Yes (7/22/2018  7:30 PM)  Presence of Pain: denies (7/22/2018  7:30 PM)  Nicole Score: 10 (7/22/2018  7:30 PM)      "

## 2018-07-23 NOTE — PLAN OF CARE
Deonna met with pt at bedside to complete assessment. Pt reports he was admitted at [facility] within last 30 days and left AMA. Pt reports he returned due to same issue, pain his hand. Pt reports he has no had an appetite. He reports Fran[place]le or Saul, his friends will pick him up at OR. Their number is [phone]. Deonna provided pt with transitional navigator. Pt prefers to use [facility] on [address]. No further needs at this time.      07/23/18 1416   Discharge Assessment   Assessment Type Discharge Planning Assessment   Confirmed/corrected address and phone number on facesheet? Yes   Assessment information obtained from? Patient   Prior to hospitilization cognitive status: Alert/Oriented   Prior to hospitalization functional status: Independent   Current cognitive status: Alert/Oriented   Current Functional Status: Independent   Lives With friend(s)   Able to Return to Prior Arrangements yes   Is patient able to care for self after discharge? Yes   Patient's perception of discharge disposition home or selfcare   Readmission Within The Last 30 Days previous discharge plan unsuccessful  (Pt reports he left AMA)   If yes, most recent facility name: [facility]   Patient currently being followed by outpatient case management? No   Patient currently receives any other outside agency services? No   Equipment Currently Used at Home none   Do you have any problems affording any of your prescribed medications? TBD  (Pt reports he does not take any medication.)   Does the patient have transportation home? Yes  (Family will provide dc transportation.)   Transportation Available car   Discharge Plan A Home   Discharge Plan B Home   Patient/Family In Agreement With Plan yes   Readmission Questionnaire   At the time of your discharge, did someone talk to you about what your health problems were? Yes   At the time of discharge, did someone talk to you about what to watch out for regarding worsening of your  health problem? Yes   At the time of discharge, did someone talk to you about what to do if you experienced worsening of your health problem? Yes   At the time of discharge, did someone talk to you about which medication to take when you left the hospital and which ones to stop taking? No   At the time of discharge, did someone talk to you about when and where to follow up with a doctor after you left the hospital? Yes   What do you believe caused you to be sick enough to be re-admitted? Pt returned for same issue due to leaving AMA previously.   How often do you need to have someone help you when you read instructions, pamphlets, or other written material from your doctor or pharmacy? Never   Do you have any problems affording any of  your prescribed medications? To be determined   Does the patient have transportation to healthcare appointments? Yes   Living Arrangements apartment   Does the patient have family/friends to help with healtcare needs after discharge? yes   Does your caregiver provide all the help you need? Yes   Are you currently feeling confused? No   Are you currently having problems thinking? No   Are you currently having memory problems? No   Have you felt down, depressed, or hopeless? 0   Have you felt little interest or pleasure in doing things? 0   In the last 7 days, my sleep quality was: very poor     Kyleigh Gilliam LMSW

## 2018-07-24 VITALS
HEIGHT: 66 IN | DIASTOLIC BLOOD PRESSURE: 76 MMHG | TEMPERATURE: 98 F | OXYGEN SATURATION: 100 % | WEIGHT: 150.38 LBS | BODY MASS INDEX: 24.17 KG/M2 | HEART RATE: 65 BPM | RESPIRATION RATE: 18 BRPM | SYSTOLIC BLOOD PRESSURE: 130 MMHG

## 2018-07-24 PROBLEM — A41.9 SEPSIS: Status: RESOLVED | Noted: 2018-07-22 | Resolved: 2018-07-24

## 2018-07-24 LAB
ANION GAP SERPL CALC-SCNC: 8 MMOL/L
BACTERIA UR CULT: NO GROWTH
BASOPHILS # BLD AUTO: 0.02 K/UL
BASOPHILS NFR BLD: 0.2 %
BUN SERPL-MCNC: 5 MG/DL
CALCIUM SERPL-MCNC: 9 MG/DL
CHLORIDE SERPL-SCNC: 103 MMOL/L
CO2 SERPL-SCNC: 26 MMOL/L
CREAT SERPL-MCNC: 0.7 MG/DL
DIFFERENTIAL METHOD: ABNORMAL
EOSINOPHIL # BLD AUTO: 0.1 K/UL
EOSINOPHIL NFR BLD: 0.7 %
ERYTHROCYTE [DISTWIDTH] IN BLOOD BY AUTOMATED COUNT: 13.3 %
EST. GFR  (AFRICAN AMERICAN): >60 ML/MIN/1.73 M^2
EST. GFR  (NON AFRICAN AMERICAN): >60 ML/MIN/1.73 M^2
GLUCOSE SERPL-MCNC: 104 MG/DL
HCT VFR BLD AUTO: 33.7 %
HGB BLD-MCNC: 11.3 G/DL
LYMPHOCYTES # BLD AUTO: 2.1 K/UL
LYMPHOCYTES NFR BLD: 19.7 %
MCH RBC QN AUTO: 28.6 PG
MCHC RBC AUTO-ENTMCNC: 33.5 G/DL
MCV RBC AUTO: 85 FL
MONOCYTES # BLD AUTO: 0.8 K/UL
MONOCYTES NFR BLD: 7.9 %
NEUTROPHILS # BLD AUTO: 7.6 K/UL
NEUTROPHILS NFR BLD: 72 %
PLATELET # BLD AUTO: 255 K/UL
PMV BLD AUTO: 9.6 FL
POTASSIUM SERPL-SCNC: 3.8 MMOL/L
RBC # BLD AUTO: 3.95 M/UL
SODIUM SERPL-SCNC: 137 MMOL/L
VANCOMYCIN TROUGH SERPL-MCNC: 7.3 UG/ML
WBC # BLD AUTO: 10.55 K/UL

## 2018-07-24 PROCEDURE — 25000003 PHARM REV CODE 250: Performed by: EMERGENCY MEDICINE

## 2018-07-24 PROCEDURE — 80202 ASSAY OF VANCOMYCIN: CPT

## 2018-07-24 PROCEDURE — 25000003 PHARM REV CODE 250: Performed by: PHYSICIAN ASSISTANT

## 2018-07-24 PROCEDURE — 85025 COMPLETE CBC W/AUTO DIFF WBC: CPT

## 2018-07-24 PROCEDURE — 25000003 PHARM REV CODE 250: Performed by: HOSPITALIST

## 2018-07-24 PROCEDURE — S4991 NICOTINE PATCH NONLEGEND: HCPCS | Performed by: HOSPITALIST

## 2018-07-24 PROCEDURE — 63600175 PHARM REV CODE 636 W HCPCS: Performed by: EMERGENCY MEDICINE

## 2018-07-24 PROCEDURE — 25000003 PHARM REV CODE 250: Performed by: INTERNAL MEDICINE

## 2018-07-24 PROCEDURE — 36415 COLL VENOUS BLD VENIPUNCTURE: CPT

## 2018-07-24 PROCEDURE — 80048 BASIC METABOLIC PNL TOTAL CA: CPT

## 2018-07-24 RX ORDER — AMOXICILLIN AND CLAVULANATE POTASSIUM 875; 125 MG/1; MG/1
1 TABLET, FILM COATED ORAL 2 TIMES DAILY
Qty: 20 TABLET | Refills: 0 | Status: SHIPPED | OUTPATIENT
Start: 2018-07-24 | End: 2018-08-03

## 2018-07-24 RX ADMIN — HYDROCODONE BITARTRATE AND ACETAMINOPHEN 1 TABLET: 10; 325 TABLET ORAL at 04:07

## 2018-07-24 RX ADMIN — SODIUM CHLORIDE, SODIUM LACTATE, POTASSIUM CHLORIDE, AND CALCIUM CHLORIDE: .6; .31; .03; .02 INJECTION, SOLUTION INTRAVENOUS at 12:07

## 2018-07-24 RX ADMIN — SODIUM CHLORIDE, SODIUM LACTATE, POTASSIUM CHLORIDE, AND CALCIUM CHLORIDE: .6; .31; .03; .02 INJECTION, SOLUTION INTRAVENOUS at 01:07

## 2018-07-24 RX ADMIN — NICOTINE 1 PATCH: 21 PATCH, EXTENDED RELEASE TRANSDERMAL at 08:07

## 2018-07-24 RX ADMIN — VANCOMYCIN HYDROCHLORIDE 1 G: 1 INJECTION, POWDER, LYOPHILIZED, FOR SOLUTION INTRAVENOUS at 01:07

## 2018-07-24 RX ADMIN — SODIUM CHLORIDE, SODIUM LACTATE, POTASSIUM CHLORIDE, AND CALCIUM CHLORIDE: .6; .31; .03; .02 INJECTION, SOLUTION INTRAVENOUS at 08:07

## 2018-07-24 RX ADMIN — VANCOMYCIN HYDROCHLORIDE 1500 MG: 10 INJECTION, POWDER, LYOPHILIZED, FOR SOLUTION INTRAVENOUS at 06:07

## 2018-07-24 NOTE — PLAN OF CARE
Problem: Patient Care Overview  Goal: Plan of Care Review  Outcome: Ongoing (interventions implemented as appropriate)  POC reviewed, including indications and possible side effects of administered medications. Patient verbalized understanding and teach back. No adverse reactions noted. Patient c/o RUE pain. Administered medications per order. Dressing remains clean, intact and dry. Patient remains free of falls and injuries during shift. Will continue to monitor.     24 hour chart check complete.

## 2018-07-24 NOTE — ASSESSMENT & PLAN NOTE
- wbc 15, temp 101F, lactate 2.4  - NS 30ml/kg bolus given in ER  - repeat lactic 1.2  - continue fluids and abx  7/24/18  Resolving. Continue IV ABX. Blood cultures NGTD

## 2018-07-24 NOTE — SUBJECTIVE & OBJECTIVE
Interval History: Ortho changed packing this morning. Awaiting cultures.       Review of Systems   Constitutional: Negative for activity change, appetite change, chills, diaphoresis, fatigue, fever and unexpected weight change.   HENT: Negative for congestion, facial swelling, rhinorrhea, sinus pressure, sneezing, sore throat, tinnitus and trouble swallowing.    Eyes: Negative for photophobia, discharge, redness and visual disturbance.   Respiratory: Negative for apnea, cough, chest tightness, shortness of breath, wheezing and stridor.    Cardiovascular: Negative for chest pain, palpitations and leg swelling.   Gastrointestinal: Negative for abdominal distention, abdominal pain, anal bleeding, blood in stool, constipation, diarrhea, nausea and vomiting.   Endocrine: Negative for polydipsia, polyphagia and polyuria.   Genitourinary: Negative for decreased urine volume, difficulty urinating, discharge, dysuria, flank pain, frequency and hematuria.   Musculoskeletal: Positive for arthralgias. Negative for back pain, gait problem, joint swelling, myalgias, neck pain and neck stiffness.   Skin: Positive for wound. Negative for color change, pallor and rash.        Wound to right wrist.    Allergic/Immunologic: Negative for immunocompromised state.   Neurological: Negative for dizziness, tremors, seizures, syncope, facial asymmetry, speech difficulty, weakness, light-headedness, numbness and headaches.   Psychiatric/Behavioral: Negative for behavioral problems, confusion, hallucinations and suicidal ideas. The patient is not nervous/anxious.    All other systems reviewed and are negative.    Objective:     Vital Signs (Most Recent):  Temp: 98.7 °F (37.1 °C) (07/24/18 1132)  Pulse: 68 (07/24/18 1132)  Resp: 18 (07/24/18 1132)  BP: 132/84 (07/24/18 1132)  SpO2: 100 % (07/24/18 1132) Vital Signs (24h Range):  Temp:  [98.2 °F (36.8 °C)-99.2 °F (37.3 °C)] 98.7 °F (37.1 °C)  Pulse:  [60-71] 68  Resp:  [16-18] 18  SpO2:  [99  %-100 %] 100 %  BP: (131-148)/(80-92) 132/84     Weight: 68.2 kg (150 lb 6.4 oz)  Body mass index is 24.28 kg/m².    Intake/Output Summary (Last 24 hours) at 07/24/18 1223  Last data filed at 07/24/18 1100   Gross per 24 hour   Intake          3776.25 ml   Output                0 ml   Net          3776.25 ml      Physical Exam   Constitutional: He is oriented to person, place, and time. He appears well-developed and well-nourished. No distress.   HENT:   Head: Normocephalic and atraumatic.   Mouth/Throat: Oropharynx is clear and moist.   Eyes: Conjunctivae are normal. Pupils are equal, round, and reactive to light. No scleral icterus.   Neck: Normal range of motion. Neck supple. No JVD present. No thyromegaly present.   Cardiovascular: Normal rate, regular rhythm, normal heart sounds and intact distal pulses.  Exam reveals no gallop and no friction rub.    No murmur heard.  Pulmonary/Chest: Effort normal and breath sounds normal. No respiratory distress. He has no wheezes. He has no rales.   Abdominal: Soft. Bowel sounds are normal. He exhibits no distension. There is no tenderness. There is no guarding.   Musculoskeletal: Normal range of motion. He exhibits no edema, tenderness or deformity.   R hand/forearm dressing c/d/i   Neurological: He is alert and oriented to person, place, and time. No cranial nerve deficit.   Skin: Skin is warm and dry. Capillary refill takes 2 to 3 seconds. No rash noted. He is not diaphoretic. No erythema.   Psychiatric: He has a normal mood and affect. His behavior is normal.   Nursing note and vitals reviewed.      Significant Labs: All pertinent labs within the past 24 hours have been reviewed.    Significant Imaging:   Imaging Results          X-Ray Chest AP Portable (Final result)  Result time 07/22/18 16:42:34    Final result by Nabil Khan MD (07/22/18 16:42:34)                 Impression:      No acute findings.      Electronically signed by: Nabil Khan  MD  Date:    07/22/2018  Time:    16:42             Narrative:    EXAMINATION:  XR CHEST AP PORTABLE    CLINICAL HISTORY:  Sepsis;    COMPARISON:  07/19/2018    FINDINGS:  Normal heart size. No definite infiltrates seen.  Markings in the left retrocardiac region are slightly prominent, likely vascular markings accentuated by relatively shallow lung volumes compared to prior study.

## 2018-07-24 NOTE — NURSING
"Pt called RN into room and stated he "needs to get out of here today". ZOILA Layton NP made aware and at bedside. Instructed pt on risks of leaving against medical advice.   "

## 2018-07-24 NOTE — PROGRESS NOTES
"[facility] Hospital Medicine  Progress Note    Patient Name: Dominic De Jesus Jr.  MRN: [id number]  Patient Class: IP- Inpatient   Admission Date: 7/22/2018  Length of Stay: 2 days  Attending Physician: Lillie Cameron MD  Primary Care Provider: Primary Doctor No        Subjective:     Principal Problem:Infection of right wrist    HPI:  42M h/o R wrist abscess s/p I/D of abscess and removal of necrotic carpal bone on 7/19 and left AMA,  presents with increase in drainage of right wrist infection.  He reports he left AMA because "I was out of my mind".He denies any fever. Unable to move his wrist, thumb, or fingers on his Right UE. No pain or problems with his Right elbow. Denies any other extremity or joint pain or swelling.   In ER,  Temp 101F. .  Labs show lactate  Orthopedic surgery was called in ER and took patient for irrigation and debridement.  Hospital medicine consulted for medical management.      Patient doing well post op.  Denies fevers or pain at site.  No other complaints.     Hospital Course:  7/23/18 The patient reports improvement in symptoms overnight. Ortho removed some packing today and plans for further removal tomorrow. The patient reports that his pain is adequately controlled. Encouraged elevation of his RUE. Continue IV ABX. 7/24/18 Ortho changed packing this morning. Awaiting cultures.     Interval History: Ortho changed packing this morning. Awaiting cultures.       Review of Systems   Constitutional: Negative for activity change, appetite change, chills, diaphoresis, fatigue, fever and unexpected weight change.   HENT: Negative for congestion, facial swelling, rhinorrhea, sinus pressure, sneezing, sore throat, tinnitus and trouble swallowing.    Eyes: Negative for photophobia, discharge, redness and visual disturbance.   Respiratory: Negative for apnea, cough, chest tightness, shortness of breath, wheezing and stridor.    Cardiovascular: Negative for chest " pain, palpitations and leg swelling.   Gastrointestinal: Negative for abdominal distention, abdominal pain, anal bleeding, blood in stool, constipation, diarrhea, nausea and vomiting.   Endocrine: Negative for polydipsia, polyphagia and polyuria.   Genitourinary: Negative for decreased urine volume, difficulty urinating, discharge, dysuria, flank pain, frequency and hematuria.   Musculoskeletal: Positive for arthralgias. Negative for back pain, gait problem, joint swelling, myalgias, neck pain and neck stiffness.   Skin: Positive for wound. Negative for color change, pallor and rash.        Wound to right wrist.    Allergic/Immunologic: Negative for immunocompromised state.   Neurological: Negative for dizziness, tremors, seizures, syncope, facial asymmetry, speech difficulty, weakness, light-headedness, numbness and headaches.   Psychiatric/Behavioral: Negative for behavioral problems, confusion, hallucinations and suicidal ideas. The patient is not nervous/anxious.    All other systems reviewed and are negative.    Objective:     Vital Signs (Most Recent):  Temp: 98.7 °F (37.1 °C) (07/24/18 1132)  Pulse: 68 (07/24/18 1132)  Resp: 18 (07/24/18 1132)  BP: 132/84 (07/24/18 1132)  SpO2: 100 % (07/24/18 1132) Vital Signs (24h Range):  Temp:  [98.2 °F (36.8 °C)-99.2 °F (37.3 °C)] 98.7 °F (37.1 °C)  Pulse:  [60-71] 68  Resp:  [16-18] 18  SpO2:  [99 %-100 %] 100 %  BP: (131-148)/(80-92) 132/84     Weight: 68.2 kg (150 lb 6.4 oz)  Body mass index is 24.28 kg/m².    Intake/Output Summary (Last 24 hours) at 07/24/18 1223  Last data filed at 07/24/18 1100   Gross per 24 hour   Intake          3776.25 ml   Output                0 ml   Net          3776.25 ml      Physical Exam   Constitutional: He is oriented to person, place, and time. He appears well-developed and well-nourished. No distress.   HENT:   Head: Normocephalic and atraumatic.   Mouth/Throat: Oropharynx is clear and moist.   Eyes: Conjunctivae are normal. Pupils  are equal, round, and reactive to light. No scleral icterus.   Neck: Normal range of motion. Neck supple. No JVD present. No thyromegaly present.   Cardiovascular: Normal rate, regular rhythm, normal heart sounds and intact distal pulses.  Exam reveals no gallop and no friction rub.    No murmur heard.  Pulmonary/Chest: Effort normal and breath sounds normal. No respiratory distress. He has no wheezes. He has no rales.   Abdominal: Soft. Bowel sounds are normal. He exhibits no distension. There is no tenderness. There is no guarding.   Musculoskeletal: Normal range of motion. He exhibits no edema, tenderness or deformity.   R hand/forearm dressing c/d/i   Neurological: He is alert and oriented to person, place, and time. No cranial nerve deficit.   Skin: Skin is warm and dry. Capillary refill takes 2 to 3 seconds. No rash noted. He is not diaphoretic. No erythema.   Psychiatric: He has a normal mood and affect. His behavior is normal.   Nursing note and vitals reviewed.      Significant Labs: All pertinent labs within the past 24 hours have been reviewed.    Significant Imaging:   Imaging Results          X-Ray Chest AP Portable (Final result)  Result time 07/22/18 16:42:34    Final result by Nabil Khan MD (07/22/18 16:42:34)                 Impression:      No acute findings.      Electronically signed by: Nabil Khan MD  Date:    07/22/2018  Time:    16:42             Narrative:    EXAMINATION:  XR CHEST AP PORTABLE    CLINICAL HISTORY:  Sepsis;    COMPARISON:  07/19/2018    FINDINGS:  Normal heart size. No definite infiltrates seen.  Markings in the left retrocardiac region are slightly prominent, likely vascular markings accentuated by relatively shallow lung volumes compared to prior study.                                   Assessment/Plan:      * Infection of right wrist    - s/p irrigation and debridement by Ortho  - continue vanc and IVF  - follow up wound cx   7/23/18  The patient reports  improvement in symptoms overnight. Ortho removed some packing today and plans for further removal tomorrow. The patient reports that his pain is adequately controlled. Encouraged elevation of his RUE. Continue IV ABX.   7/24/18  Ortho changed packing this morning. Awaiting cultures. Continue daily wound care. Ortho following             Normocytic anemia    - Monitor and transfuse if symptomatic  - CBC in am           Sepsis    - wbc 15, temp 101F, lactate 2.4  - NS 30ml/kg bolus given in ER  - repeat lactic 1.2  - continue fluids and abx  7/24/18  Resolving. Continue IV ABX. Blood cultures NGTD          VTE Risk Mitigation         Ordered     heparin (porcine) injection 5,000 Units  Every 8 hours      07/22/18 2005     IP VTE HIGH RISK PATIENT  Once      07/22/18 2005              Jai Layton NP  Department of Hospital Medicine   [facility]

## 2018-07-24 NOTE — PLAN OF CARE
Problem: Patient Care Overview  Goal: Plan of Care Review  Outcome: Ongoing (interventions implemented as appropriate)  Fall precautions maintained, pt remains free from injuries/fall. Independent. Denies pain and nausea. Tolerating diet. Meds given as ordered.  POC and meds discussed with pt, pt verbalized understanding. Side rails x 2, bed locked and low, phone and call light w/in reach. VSS. Will cont to monitor.

## 2018-07-24 NOTE — NURSING
Pt's signed AMA paper work after pt was instructed that pt's infection could worsen, loss of limb, organ dysfunction, and/or death can occur with leaving against medical advice. Pt's left FA IV removed. Gauze and tape applied. Paper RX for PO ABX given to pt by ZOILA Layton NP. Surgical dressing intact to right wrist. Pt dressed.

## 2018-07-24 NOTE — ASSESSMENT & PLAN NOTE
- s/p irrigation and debridement by Ortho  - continue vanc and IVF  - follow up wound cx   7/23/18  The patient reports improvement in symptoms overnight. Ortho removed some packing today and plans for further removal tomorrow. The patient reports that his pain is adequately controlled. Encouraged elevation of his RUE. Continue IV ABX.   7/24/18  Ortho changed packing this morning. Awaiting cultures. Continue daily wound care. Ortho following

## 2018-07-24 NOTE — PROGRESS NOTES
Clinical Pharmacy Progress Note: Vancomycin Dosing     Vancomycin Day #2   Estimated Creatinine Clearance: 121.5 mL/min (based on SCr of 0.7 mg/dL).   SCr trend: (stable x 1 day)   Lab Results   Component Value Date    WBC 10.55 07/24/2018   WBC trend: (decreased x 2 days)         Tmax/24h: 99.2   Level: 7.3 mcg/mL   Goal trough: 15-20 mcg/mL   Indication: sepsis, infection of right wrist in IVDU    Cultures: Blood on 7/22- NGTD, Abscess on 7/19 NGTD   Plan: Pharmacy will change vancomycin dose to 1000 mg IV every 8 hours. Patient's current estimated half-life elimination is ~6 hours. Despite age of 42, patient is IVDU and has a higher goal trough.   Next level due: Vancomycin trough due 07/25/18 @ 0430.     Thank you for allowing us to participate in this patient's care.    Gia Campbell, PharmD 7/24/2018 7:46 AM

## 2018-07-24 NOTE — PROGRESS NOTES
S: no c/o pain at this time    O: splint intact  Cast padding and ace removed and some iodoform packing removed  Incision borders are not erythematous   Moves fingers without difficulty  Sensation intact to fingers    Cultures - intraop - still pending  On vanc    A: s/p I and D right wrist secondary to infection with h/o IVDU    P: continue abx - await final culture - results pending at present  Continue iodoform packing removal daily  Continue splint/elevation

## 2018-07-24 NOTE — DISCHARGE SUMMARY
"[facility] Hospital Medicine  Discharge Summary      Patient Name: Dominic De Jesus Jr.  MRN: [id number]  Admission Date: 7/22/2018  Hospital Length of Stay: 2 days  Discharge Date and Time:  07/24/2018 4:16 PM  Attending Physician: Lillie Cameron MD   Discharging Provider: Jai Layton NP  Primary Care Provider: Primary Doctor No      HPI:   42M h/o R wrist abscess s/p I/D of abscess and removal of necrotic carpal bone on 7/19 and left AMA,  presents with increase in drainage of right wrist infection.  He reports he left AMA because "I was out of my mind".He denies any fever. Unable to move his wrist, thumb, or fingers on his Right UE. No pain or problems with his Right elbow. Denies any other extremity or joint pain or swelling.   In ER,  Temp 101F. .  Labs show lactate  Orthopedic surgery was called in ER and took patient for irrigation and debridement.  Hospital medicine consulted for medical management.      Patient doing well post op.  Denies fevers or pain at site.  No other complaints.     Procedure(s) (LRB):  IRRIGATION AND DEBRIDEMENT, UPPER EXTREMITY (Right)      Hospital Course:   7/23/18 The patient reports improvement in symptoms overnight. Ortho removed some packing today and plans for further removal tomorrow. The patient reports that his pain is adequately controlled. Encouraged elevation of his RUE. Continue IV ABX. 7/24/18 Ortho changed packing this morning. Awaiting cultures.     Addendum 7/24/18 Notified by nursing staff that the patient wishes to leave AMA. Discussed with patient the risk associated with leaving AMA. The patient was provided with a prescription for a 10 day course of Augmentin. The patient was strongly encouraged to follow up with his PCP and with Orthopedic surgery.      Consults:   Consults         Status Ordering Provider     Inpatient consult to Hospitalist  Once     Provider:  Sigifredo Ferraro MD    Acknowledged SARAH CHARLES     " Pharmacy to dose Vancomycin consult  Once     Provider:  (Not yet assigned)    Acknowledged SARAH CHARLES          No new Assessment & Plan notes have been filed under this hospital service since the last note was generated.  Service: Hospital Medicine    Final Active Diagnoses:    Diagnosis Date Noted POA    PRINCIPAL PROBLEM:  Infection of right wrist [M00.9] 07/22/2018 Yes    Normocytic anemia [D64.9] 07/23/2018 Unknown      Problems Resolved During this Admission:    Diagnosis Date Noted Date Resolved POA    Sepsis [A41.9] 07/22/2018 07/24/2018 Yes       Discharged Condition: against medical advice    Disposition: Left Against Medical Adv*    Follow Up:  Follow-up Information     Navneet Tripp MD. Schedule an appointment as soon as possible for a visit in 2 days.    Specialty:  Orthopedic Surgery  Contact information:  [address]  [address]  [place] [address] [phone]                 Patient Instructions:   No discharge procedures on file.    Significant Diagnostic Studies:     Pending Diagnostic Studies:     None         Medications:  Reconciled Home Medications:      Medication List      START taking these medications    amoxicillin-clavulanate 875-125mg 875-125 mg per tablet  Commonly known as:  AUGMENTIN  Take 1 tablet by mouth 2 (two) times daily. for 10 days            Indwelling Lines/Drains at time of discharge:   Lines/Drains/Airways     Drain                 Open Drain 07/19/18 2145 Right;Dorsal Other (Comment) Penrose 1/4 inch 4 days                Time spent on the discharge of patient: > 30 minutes  Patient was seen and examined on the date of discharge and determined to be suitable for discharge.         Jai Layton NP  Department of Hospital Medicine  [facility]

## 2018-07-25 LAB — BACTERIA SPEC AEROBE CULT: NORMAL

## 2018-07-25 NOTE — PHYSICIAN QUERY
"PT Name: Dominic De Jesus Jr.  MR #: [id number]    Physician Query Form - Procedure Clarification     CDS: Jerri Gomez RN  Contact information: [email or web address]    This form is a permanent document in the medical record.     Query Date: July 25, 2018  By submitting this query, we are merely seeking further clarification of documentation. Please utilize your independent clinical judgment when addressing the question(s) below.    The Medical record contains the following:     Indicators       Supporting Clinical Findings   Location in Medical Record   x Documentation of "Debridement"   Procedure:  Irrigation and debridement right wrist abscess with debridement of necrotic carpal bone    We then did our turned our attention distally to the wrist and a 4 cm dorsal incision was made over the 3rd extensor compartment over the dorsal carpal tubercle. We then took our incision down through the dorsal tissues carefully protecting any tendons and veins down to the dorsal wrist capsule.  An arthrotomy was then made into the dorsal wrist joint longitudinally and copious amounts of pus were evacuated.  Cultures were obtained were obtained multiple times and necrotic dead bone fragments were removed. These were sent for pathology we then used x-ray to ensure that we had evacuated the entire radial aspect as well as ulnar aspects of the wrist joint as well as the scapholunate joint all pus pockets were completely evacuated with necrotic synovium debrided extensively and 2 L of antibiotic soak solution were then irrigated through the wound and joint profusely. Op note 7/19    Documentation of "I & D"      EBL =      Other:       Excisional debridement is a surgical removal of  nonvitalized tissue, necrosis or slough. The use of a sharp instrument does not always indicate that an excisional debridement was performed.  Non excisional debridement is the scraping away or removal of loose tissue " fragments.    Provider, please specify type of procedure performed:    [x  ] Excisional Debridement ( type, depth of tissue removal, and instrument)   * Depth of tissue excised:    [  ] Skin [  ] Subcutaneous/Fascia [  ] Tendons [  ] Muscle [x  ] Bone   * Instrument used:    [  ] Scalpel [  ] Scissors [  ] Curette [  x] Other (Specify) ______Rongeur______________    [  ] Non Excisional Debridement   * Depth of tissue debrided:    [  ] Skin [  ] Subcutaneous/Fascia [  ] Tendons [  ] Muscle [  ] Bone    [  ] Other Procedure (Specify) ________________________________    [  ] Clinically Undetermined

## 2018-07-25 NOTE — PLAN OF CARE
07/25/18 0755   Final Note   Assessment Type Final Discharge Note   Discharge Disposition Left Against   Right Care Referral Info   Post Acute Recommendation No Care

## 2018-07-27 LAB
BACTERIA BLD CULT: NORMAL
BACTERIA SPEC ANAEROBE CULT: NORMAL
BACTERIA SPEC ANAEROBE CULT: NORMAL

## 2018-07-28 LAB — BACTERIA BLD CULT: NORMAL

## 2018-07-30 NOTE — PHYSICIAN QUERY
PT Name: Dominic De Jesus Jr.  MR #: [id number]    Physician Query Form - Pathology Findings Clarification     CDS: Jerri Gomez RN  Contact information: [email or web address]/[phone]    This form is a permanent document in the medical record.     Query Date: July 30, 2018      By submitting this query, we are merely seeking further clarification of documentation.  Please utilize your independent clinical judgment when addressing the question(s) below.      The medical record contains the following:     Findings Supporting Clinical Information Location in Medical Record   Osteomyelitis     SPECIMEN  1) Necrotic right carpal bone.  FINAL PATHOLOGIC DIAGNOSIS  Necrotic right carpal bone:  Bone and cartilage with necrosis and osteomyelitis.        Date of surgery 7/19/2018  Procedure:  Irrigation and debridement right wrist abscess with debridement of necrotic carpal bone     Path report 7/19                Op note 7/19     Please document the clinical significance of the Pathologists findings of osteomyelitis.          [  x] I agree with the Pathology Findings             Please specify acuity:  [ x ] Acute  [  ] Chronic  [  ] Subacute  [  ] Clinically undetermined                   [  ] I do not agree with the Pathology Findings        [  ] Clinically Insignificant        [  ] Clinically Undetermined        [  ] Other/Clarification of Findings: ______________________________________________    Please document in your progress notes daily for the duration of treatment until resolved and include in your discharge summary.

## 2018-07-30 NOTE — DISCHARGE SUMMARY
[facility]  Orthopedics  Discharge Summary      Patient Name: Dominic De Jesus Jr.  MRN: [id number]  Admission Date: 7/19/2018  Hospital Length of Stay: 1 days  Discharge Date and Time: 7/20/18   Attending Physician: No att. providers found   Discharging Provider: LISA Winn MD  Primary Care Provider: Primary Doctor No    HPI: Patient underwent procedures as below. Left AMA within hour of procedure.    Procedure(s) (LRB):  INCISION AND DRAINAGE,UPPER EXTREMITY (Right)      Hospital Course: Tolerated procedure well, extubated deemed stable and admit to the floor.        Significant Diagnostic Studies:     Pending Diagnostic Studies:     Procedure Component Value Units Date/Time    EKG 12-lead [[id number]]     Order Status:  Sent Lab Status:  No result         Final Active Diagnoses:    Diagnosis Date Noted POA    PRINCIPAL PROBLEM:  Pain and swelling of right wrist [M25.531, M25.431] 07/19/2018 Yes    Hypokalemia [E87.6] 07/20/2018 Yes      Problems Resolved During this Admission:    Diagnosis Date Noted Date Resolved POA      Discharged Condition: against medical advice    Disposition: Left Against Medical Adv*    Follow Up:left AMA    Patient Instructions:   No discharge procedures on file.  Medications:  Left against medical advice, none    LISA Winn MD  Orthopedics  [facility]

## 2018-07-30 NOTE — PHYSICIAN QUERY
PT Name: Dominic De Jesus Jr.  MR #: [id number]    Physician Query Form - Cause and Effect Relationship Clarification      CDS/: Sushma GONZALEZ, RN, CCDS               Contact information: [email or web address]    This form is a permanent document in the medical record.     Query Date: July 30, 2018    By submitting this query, we are merely seeking further clarification of documentation. Please utilize your independent clinical judgment when addressing the question(s) below.    The Medical record contains the following:  Supporting Clinical Findings   Location in record      Problems Resolved During this Admission:       Sepsis      Discharge Summary       Aerobic Bacterial Culture   STREPTOCOCCUS PYOGENES (GROUP A)   Moderate   Beta-hemolytic streptococci are routinely susceptible to   penicillins,cephalosporins and carbapenems.         Aerobic Bacterial Culture 7/22/18                                                                       HPI:   42M h/o R wrist abscess s/p I/D of abscess and removal of necrotic carpal bone on 7/19 and left AMA,  presents with increase in drainage of right wrist infection.                                                                                                                Hospital Course:   7/23/18 The patient reports improvement in symptoms overnight. Ortho removed some packing today and plans for further removal tomorrow. The patient reports that his pain is adequately controlled. Encouraged elevation of his RUE. Continue IV ABX. 7/24/18 Ortho changed packing this morning. Awaiting cultures.          The patient was provided with a prescription for a 10 day course of Augmentin.      Discharge Summary         Provider, please clarify if there is any correlation between ____Sepsis_____ and ___Streptococcus Pyogenes (Group A)____.           Are the conditions:     [xx ] Due to or associated with each other     [  ] Unrelated to each other     [  ] Other (Please  Specify): _________________________     [  ] Clinically Undetermined

## 2018-08-28 LAB — FUNGUS SPEC CULT: NORMAL

## (undated) DEVICE — SEE MEDLINE ITEM 152622

## (undated) DEVICE — GLOVE PROTEXIS HYDROGEL SZ8

## (undated) DEVICE — DRAIN PENROSE XRAY 12 X 1/4 ST

## (undated) DEVICE — NDL SAFETY 25G X 1.5 ECLIPSE

## (undated) DEVICE — COVER OVERHEAD SURG LT BLUE

## (undated) DEVICE — GLOVE PROTEXIS HYDROGEL SZ7.5

## (undated) DEVICE — SEE MEDLINE ITEM 157173

## (undated) DEVICE — PAD CAST SPECIALIST STRL 4

## (undated) DEVICE — ELECTRODE REM PLYHSV RETURN 9

## (undated) DEVICE — GAUZE SPONGE 4X4 12PLY

## (undated) DEVICE — SPLINT CAST ROLL 2IN X 15 FT

## (undated) DEVICE — SEE MEDLINE ITEM 152515

## (undated) DEVICE — SEE MEDLINE ITEM 157216

## (undated) DEVICE — SOL NACL IRR 1000ML BTL

## (undated) DEVICE — DRAPE CASSETTE 20 X 40

## (undated) DEVICE — STRIP PACKING ANTIMIC 1/4X1

## (undated) DEVICE — GLOVE 8.5 PROTEXIS PI BLUE

## (undated) DEVICE — SEE MEDLINE ITEM 154981

## (undated) DEVICE — SEE MEDLINE ITEM 157027

## (undated) DEVICE — SEE L#120831

## (undated) DEVICE — MANIFOLD 4 PORT

## (undated) DEVICE — SUT SILK 3-0 BLK BR SH 30IN

## (undated) DEVICE — SEE MEDLINE ITEM 146308

## (undated) DEVICE — TOURNIQUET SB QC DP 18X4IN

## (undated) DEVICE — SEE MEDLINE ITEM 157117

## (undated) DEVICE — SYR 10CC LUER LOCK

## (undated) DEVICE — SOL NS 1000CC

## (undated) DEVICE — GLOVE PROTEXIS HYDROGEL SZ6.5

## (undated) DEVICE — SWAB CULTURETTE II DUAL

## (undated) DEVICE — SEE MEDLINE ITEM 157131

## (undated) DEVICE — GLOVE BIOGEL PI ORTHO PRO SZ7

## (undated) DEVICE — GLOVE 8 PROTEXIS PI BLUE